# Patient Record
Sex: FEMALE | Race: WHITE | NOT HISPANIC OR LATINO | Employment: OTHER | URBAN - METROPOLITAN AREA
[De-identification: names, ages, dates, MRNs, and addresses within clinical notes are randomized per-mention and may not be internally consistent; named-entity substitution may affect disease eponyms.]

---

## 2019-12-16 ENCOUNTER — OFFICE VISIT (OUTPATIENT)
Dept: FAMILY MEDICINE | Facility: HOSPITAL | Age: 29
End: 2019-12-16
Attending: FAMILY MEDICINE
Payer: MEDICAID

## 2019-12-16 VITALS
WEIGHT: 115.31 LBS | HEART RATE: 105 BPM | SYSTOLIC BLOOD PRESSURE: 132 MMHG | HEIGHT: 66 IN | DIASTOLIC BLOOD PRESSURE: 85 MMHG | BODY MASS INDEX: 18.53 KG/M2

## 2019-12-16 DIAGNOSIS — Z01.419 WELL WOMAN EXAM WITH ROUTINE GYNECOLOGICAL EXAM: Primary | ICD-10-CM

## 2019-12-16 DIAGNOSIS — J45.20 MILD INTERMITTENT ASTHMA WITHOUT COMPLICATION: ICD-10-CM

## 2019-12-16 DIAGNOSIS — Z30.013 ENCOUNTER FOR INITIAL PRESCRIPTION OF INJECTABLE CONTRACEPTIVE: ICD-10-CM

## 2019-12-16 DIAGNOSIS — D22.5 NEVUS OF BUTTOCK: ICD-10-CM

## 2019-12-16 PROCEDURE — 99203 OFFICE O/P NEW LOW 30 MIN: CPT | Performed by: STUDENT IN AN ORGANIZED HEALTH CARE EDUCATION/TRAINING PROGRAM

## 2019-12-16 RX ORDER — ALBUTEROL SULFATE 90 UG/1
2 AEROSOL, METERED RESPIRATORY (INHALATION) EVERY 6 HOURS PRN
Qty: 18 G | Refills: 12 | Status: SHIPPED | OUTPATIENT
Start: 2019-12-16 | End: 2023-10-17

## 2019-12-16 RX ORDER — MEDROXYPROGESTERONE ACETATE 150 MG/ML
150 INJECTION, SUSPENSION INTRAMUSCULAR ONCE
Qty: 1 ML | Refills: 0 | Status: SHIPPED | OUTPATIENT
Start: 2019-12-16 | End: 2020-02-14

## 2019-12-16 NOTE — PROGRESS NOTES
Subjective:       Patient ID: Aury Dupree is a 29 y.o. female.    Chief Complaint: Well Woman exam     HPI   30 y/o female with a pmhx of Asthma, who presents to clinic today for well woman exam. Patient states that her periods have been heavy, however has had new onset menstrual cramping. Denies any pregnancy or previous pregnancies and  has a vasectomy. Patient uses 10-12 tampons a day and LMP 1 week ago and are regular. Patient does endorse post-coital bleeding. Has never had HPV vaccine. Patient states that her asthma is otherwise controlled and uses her inhaler intermittently. Patient interested in starting depo-provera. To help manager her heavy bleeding.   Pmhx: as per HPI      Allergies: Denies  Meds: Albuterol  Fhx: DM2 and HTN - mother/father  SHx: Denies any smoking, etoh or illicit drug use. Vaccinations up to date     Review of Systems   Constitutional: Negative for activity change and appetite change.   HENT: Negative for trouble swallowing.    Eyes: Negative for visual disturbance.   Respiratory: Negative for shortness of breath.    Cardiovascular: Negative for chest pain and palpitations.   Gastrointestinal: Negative for abdominal distention, diarrhea, nausea and vomiting.   Endocrine: Negative for cold intolerance and heat intolerance.   Genitourinary: Positive for menstrual problem. Negative for flank pain.   Musculoskeletal: Negative for arthralgias.   Skin: Negative for color change.   Allergic/Immunologic: Negative for immunocompromised state.   Neurological: Negative for headaches.   Hematological: Negative for adenopathy.   Psychiatric/Behavioral: Negative for agitation.       Objective:      Vitals:    12/16/19 1035   BP: 132/85   Pulse: 105     Physical Exam   Constitutional: She is oriented to person, place, and time. She appears well-developed and well-nourished.   HENT:   Head: Normocephalic and atraumatic.   Eyes: Pupils are equal, round, and reactive to light. EOM are normal.    Neck: Normal range of motion. Neck supple.   Cardiovascular: Normal rate, regular rhythm, normal heart sounds and intact distal pulses. Exam reveals no gallop and no friction rub.   No murmur heard.  Pulmonary/Chest: Effort normal and breath sounds normal. She has no wheezes. She has no rales.   Abdominal: Soft. Bowel sounds are normal. She exhibits no distension. There is no tenderness.   Genitourinary:   Genitourinary Comments: Pap smear and pelvic exam performed with Laurie Cunningham MA. Cervix appeared irregular and friable. No adnexal tenderness noted. No cervical motion tenderness.    Musculoskeletal: Normal range of motion.   Neurological: She is alert and oriented to person, place, and time.   Skin: Skin is warm and dry. Capillary refill takes less than 2 seconds.   Nevus with discoloration that is asymmetrical with irregular borders that is greater than 6mm on left buttox.    Psychiatric: She has a normal mood and affect.       Assessment:       1. Well woman exam with routine gynecological exam    2. Encounter for initial prescription of injectable contraceptive    3. Mild intermittent asthma without complication    4. Nevus of buttock        Plan:       Well woman exam with routine gynecological exam  -     TSH; Future; Expected date: 12/16/2019  -     CBC auto differential; Future; Expected date: 12/16/2019  -     Comprehensive metabolic panel; Future; Expected date: 12/16/2019  -     Liquid-Based Pap Smear, Screening with HPV testing   -     POCT urine pregnancy  -     Ambulatory referral to Gynecology - patient with irregular appearing cervix. May need colposcopy.     Encounter for initial prescription of injectable contraceptive  -     medroxyPROGESTERone (DEPO-PROVERA) 150 mg/mL Syrg; Inject 1 mL (150 mg total) into the muscle once. for 1 dose  Dispense: 1 mL; Refill: 0    Mild intermittent asthma without complication  -     albuterol (VENTOLIN HFA) 90 mcg/actuation inhaler; Inhale 2 puffs into the  lungs every 6 (six) hours as needed for Wheezing. Rescue  Dispense: 18 g; Refill: 12    Nevus of Buttox          -     Ambulatory referral to Dermatology for irregular appearing nevus on left buttox.     Follow up in about 1 month (around 1/16/2020).     Raghu Hunter MD   [employer] [facility], PGY-2

## 2019-12-16 NOTE — PROGRESS NOTES
I have reviewed the notes, assessments, and/or procedures performed, I concur with her/his documentation of Aury Dupree.

## 2020-01-01 NOTE — PROGRESS NOTES
Pap smear results from 12/16/19 performed at [facility].  Negative for intra-epithelial lesion or malignancy.   HPV mrNA E6/E7, Surepath - not detected

## 2020-01-13 ENCOUNTER — OFFICE VISIT (OUTPATIENT)
Dept: OBSTETRICS AND GYNECOLOGY | Facility: CLINIC | Age: 30
End: 2020-01-13
Payer: MEDICAID

## 2020-01-13 VITALS — BODY MASS INDEX: 18.56 KG/M2 | WEIGHT: 115 LBS | SYSTOLIC BLOOD PRESSURE: 116 MMHG | DIASTOLIC BLOOD PRESSURE: 68 MMHG

## 2020-01-13 DIAGNOSIS — Z30.09 GENERAL COUNSELING AND ADVICE ON FEMALE CONTRACEPTION: ICD-10-CM

## 2020-01-13 DIAGNOSIS — H02.109 ECTROPION, UNSPECIFIED ECTROPION TYPE, UNSPECIFIED LATERALITY: Primary | ICD-10-CM

## 2020-01-13 PROCEDURE — 99203 PR OFFICE/OUTPT VISIT, NEW, LEVL III, 30-44 MIN: ICD-10-PCS | Mod: S$PBB,,, | Performed by: OBSTETRICS & GYNECOLOGY

## 2020-01-13 PROCEDURE — 99212 OFFICE O/P EST SF 10 MIN: CPT | Mod: PBBFAC,PO | Performed by: OBSTETRICS & GYNECOLOGY

## 2020-01-13 PROCEDURE — 99999 PR PBB SHADOW E&M-EST. PATIENT-LVL II: ICD-10-PCS | Mod: PBBFAC,,, | Performed by: OBSTETRICS & GYNECOLOGY

## 2020-01-13 PROCEDURE — 99999 PR PBB SHADOW E&M-EST. PATIENT-LVL II: CPT | Mod: PBBFAC,,, | Performed by: OBSTETRICS & GYNECOLOGY

## 2020-01-13 PROCEDURE — 99203 OFFICE O/P NEW LOW 30 MIN: CPT | Mod: S$PBB,,, | Performed by: OBSTETRICS & GYNECOLOGY

## 2020-01-13 NOTE — LETTER
January 13, 2020      Raghu Hunter MD  [address]  [address]  [facility] [address]           [facility]  [address] [address]  Phone: [phone]          Patient: Aury Dupree   MR Number: [id number]   YOB: 1990   Date of Visit: 1/13/2020       Dear Dr. Raghu Hunter:    Thank you for referring Aury Dupree to me for evaluation. Attached you will find relevant portions of my assessment and plan of care.    If you have questions, please do not hesitate to call me. I look forward to following Aury Dupree along with you.    Sincerely,    Michael A. Wiedemann, MD    Enclosure  CC:  No Recipients    If you would like to receive this communication electronically, please contact [email or web address] or [phone] to request more information on [facility] access.    For providers and/or their staff who would like to refer a patient to [facility], please contact us through our one-stop-shop provider referral line, [facility] , at [phone].    If you feel you have received this communication in error or would no longer like to receive these types of communications, please e-mail [email or web address]

## 2020-01-13 NOTE — PROGRESS NOTES
29 y.o.   OB History    None       Comlaining of:  Pt had normal pap a month ago  Cycles reg but heavy 1st day or so  Wants birth control, cont type to avoid cycle  No irreg bleeding  Primary told her the cx looked bad      ROS:  GENERAL: No fever, chills, fatigability or weight loss.  SKIN: No rashes, itching or changes in color or texture of skin.  HEAD: No headaches or recent head trauma.  EYES: Visual acuity fine. No photophobia, ocular pain or diplopia.  EARS: Denies ear pain, discharge or vertigo.  NOSE: No loss of smell, no epistaxis or postnasal drip.  MOUTH & THROAT: No hoarseness or change in voice. No excessive gum bleeding.  NODES: Denies swollen glands.  CHEST: Denies IRAHETA, cyanosis, wheezing, cough and sputum production.  CARDIOVASCULAR: Denies chest pain, PND, orthopnea or reduced exercise tolerance.  ABDOMEN: Appetite fine. No weight loss. Denies diarrhea, abdominal pain, hematemesis or blood in stool.  URINARY: No flank pain, dysuria or hematuria.  PERIPHERAL VASCULAR: No claudication or cyanosis.  MUSCULOSKELETAL: No joint stiffness or swelling. Denies back pain.  NEUROLOGIC: No history of seizures, paralysis, alteration of gait or coordination      PE: /68   Wt 52.2 kg (115 lb)   LMP  (LMP Unknown)   BMI 18.56 kg/m²    abd soft  Vuvla, vag normal  cx appears normal, with ectropion  No lesions  Palpated, soft, smooth    A discussed ectropion being a normal variant, states md years ago told same  Discussed cont ocp, btb, etc    Plan, rx ocp  Fu prn

## 2020-01-14 RX ORDER — LEVONORGESTREL AND ETHINYL ESTRADIOL 0.15-0.03
1 KIT ORAL DAILY
Qty: 91 TABLET | Refills: 3 | Status: SHIPPED | OUTPATIENT
Start: 2020-01-14 | End: 2020-01-29

## 2020-01-22 ENCOUNTER — OFFICE VISIT (OUTPATIENT)
Dept: FAMILY MEDICINE | Facility: HOSPITAL | Age: 30
End: 2020-01-22
Attending: SPECIALIST
Payer: MEDICAID

## 2020-01-22 VITALS
WEIGHT: 115.5 LBS | SYSTOLIC BLOOD PRESSURE: 128 MMHG | HEIGHT: 66 IN | DIASTOLIC BLOOD PRESSURE: 83 MMHG | BODY MASS INDEX: 18.56 KG/M2 | TEMPERATURE: 97 F | HEART RATE: 101 BPM

## 2020-01-22 DIAGNOSIS — F41.1 GAD (GENERALIZED ANXIETY DISORDER): Primary | ICD-10-CM

## 2020-01-22 DIAGNOSIS — Z12.4 PAP SMEAR FOR CERVICAL CANCER SCREENING: ICD-10-CM

## 2020-01-22 PROCEDURE — 99213 OFFICE O/P EST LOW 20 MIN: CPT | Performed by: STUDENT IN AN ORGANIZED HEALTH CARE EDUCATION/TRAINING PROGRAM

## 2020-01-22 NOTE — PROGRESS NOTES
Subjective:       Patient ID: Aury Dupree is a 29 y.o. female.    Chief Complaint: TACOS Management     HPI     29  Female with a past medical history of asthma who presents to clinic here for follow-up.  Patient evaluated by gynecology prior to today's appointment 1 week prior and started patient on oral contraceptive.  Patient's recent Pap smear negative for any malignancy and HPV negative.  Ob Gyn/ evaluated cervix and determined it to be a normal variant on physical exam.  Patient has no fevers,no chills, no unintentional weight loss.  Patient doing well current birth control regimen.  Good urinary output.  Bowel movements daily.    Review of Systems   Constitutional: Negative for activity change and appetite change.   HENT: Negative for trouble swallowing.    Eyes: Negative for visual disturbance.   Respiratory: Negative for shortness of breath.    Cardiovascular: Negative for chest pain and palpitations.   Gastrointestinal: Negative for abdominal distention, diarrhea, nausea and vomiting.   Endocrine: Negative for cold intolerance and heat intolerance.   Genitourinary: Negative for flank pain.   Musculoskeletal: Negative for arthralgias.   Skin: Negative for color change.   Allergic/Immunologic: Negative for immunocompromised state.   Neurological: Negative for headaches.   Hematological: Negative for adenopathy.   Psychiatric/Behavioral: Negative for agitation.       Objective:      Vitals:    01/22/20 1108   BP: 128/83   Pulse: 101   Temp: 97.3 °F (36.3 °C)        Physical Exam   Constitutional: She is oriented to person, place, and time. She appears well-developed and well-nourished.   HENT:   Head: Normocephalic and atraumatic.   Eyes: Pupils are equal, round, and reactive to light. EOM are normal.   Neck: Normal range of motion. Neck supple.   Cardiovascular: Normal rate, regular rhythm, normal heart sounds and intact distal pulses. Exam reveals no gallop and no friction rub.   No murmur  heard.  Pulmonary/Chest: Effort normal and breath sounds normal. She has no wheezes. She has no rales.   Abdominal: Soft. Bowel sounds are normal. She exhibits no distension. There is no tenderness.   Musculoskeletal: Normal range of motion.   Neurological: She is alert and oriented to person, place, and time.   Skin: Skin is warm and dry. Capillary refill takes less than 2 seconds.   Psychiatric: She has a normal mood and affect.       Assessment:       1. TACOS (generalized anxiety disorder)    2. Pap smear for cervical cancer screening          Plan:       Generalized Anxiety Disorder   TACOS 7.   Mild and patient functional   Appointment to be scheduled with Dr. Núñez.     Pap smear for cervical cancer screening  Pap smear results from 12/16/19   Negative for intra-epithelial lesion or malignancy.   HPV mrNA E6/E7, Surepath - not detected   Results discussed with patient in detail and all questions answered.   GYN evaluated cervix and determined to be a normal variant.   Patient will need repeat pap smear with HPV screening in 5 years.     Follow up in about 1 year (around 1/22/2021) for Annual exam.        Raghu Hunter MD   [facility] [facility], PGY-2

## 2020-01-28 ENCOUNTER — TELEPHONE (OUTPATIENT)
Dept: FAMILY MEDICINE | Facility: HOSPITAL | Age: 30
End: 2020-01-28

## 2020-01-28 NOTE — TELEPHONE ENCOUNTER
----- Message from Sandi Quispe MA sent at 1/28/2020  4:28 PM CST -----  Contact:  Patient [phone]  Patient states the birth control she was put on is making her extremely nauseous.  Said she took it once and it made her throw up several times. Please call to discuss alternative.  Thanks.

## 2020-01-29 RX ORDER — NORETHINDRONE ACETATE AND ETHINYL ESTRADIOL 1; 5 MG/1; UG/1
1 TABLET ORAL DAILY
Qty: 30 TABLET | Refills: 11 | Status: SHIPPED | OUTPATIENT
Start: 2020-01-29 | End: 2020-12-15 | Stop reason: SDUPTHER

## 2020-02-14 ENCOUNTER — OFFICE VISIT (OUTPATIENT)
Dept: FAMILY MEDICINE | Facility: HOSPITAL | Age: 30
End: 2020-02-14
Attending: FAMILY MEDICINE
Payer: MEDICAID

## 2020-02-14 VITALS
BODY MASS INDEX: 18.49 KG/M2 | SYSTOLIC BLOOD PRESSURE: 137 MMHG | HEART RATE: 123 BPM | DIASTOLIC BLOOD PRESSURE: 85 MMHG | WEIGHT: 115.06 LBS | HEIGHT: 66 IN

## 2020-02-14 DIAGNOSIS — B86 SCABIES: Primary | ICD-10-CM

## 2020-02-14 PROCEDURE — 99213 OFFICE O/P EST LOW 20 MIN: CPT | Performed by: STUDENT IN AN ORGANIZED HEALTH CARE EDUCATION/TRAINING PROGRAM

## 2020-02-14 RX ORDER — IVERMECTIN 3 MG/1
10 TABLET ORAL ONCE
Qty: 4 TABLET | Refills: 0 | Status: SHIPPED | OUTPATIENT
Start: 2020-02-14 | End: 2020-02-14

## 2020-02-14 RX ORDER — IVERMECTIN 3 MG/1
12 TABLET ORAL ONCE
Qty: 4 TABLET | Refills: 0 | Status: SHIPPED | OUTPATIENT
Start: 2020-02-14 | End: 2020-02-14

## 2020-02-14 NOTE — PROGRESS NOTES
Subjective:       Patient ID: Aury Dupree is a 29 y.o. female.    Chief Complaint: Urticaria    28 y/o female presenting today complaining of diffuse rash onset gradually about 4 weeks ago. She reports that she first noticed small red lesions on her bilateral legs, but the rash has now spread to involve her abdomen, back, arms, legs, hands, and feet. The rash spares her neck and face. It is associated with pruritis but is not painful. She has attempted to treat her symptoms at home with an over the counter antihistamine and goldbond cream with minimal relief. She denies fever, chills, N/V, diarrhea or any other symptoms at this time. Denies any recent changes in medications, food, soaps, or laundry detergents. Denies any recent travel. She reports that her  developed similar symptoms about 3 days ago as well.       Review of Systems   Constitutional: Negative for activity change, appetite change and fever.   HENT: Negative for trouble swallowing.    Eyes: Negative for visual disturbance.   Respiratory: Negative for shortness of breath.    Cardiovascular: Negative for chest pain and palpitations.   Gastrointestinal: Negative for abdominal distention, diarrhea, nausea and vomiting.   Endocrine: Negative for cold intolerance and heat intolerance.   Genitourinary: Negative for flank pain.   Musculoskeletal: Negative for arthralgias.   Skin: Positive for rash (to trunk, upper and lower extremities, hands, and feet). Negative for color change.        (+) pruritis   Allergic/Immunologic: Negative for immunocompromised state.   Neurological: Negative for headaches.   Hematological: Negative for adenopathy.   Psychiatric/Behavioral: Negative for agitation.       Objective:      Vitals:    02/14/20 0945   BP: 137/85   Pulse: (!) 123     Physical Exam   Constitutional: She is oriented to person, place, and time. She appears well-developed and well-nourished.   HENT:   Head: Normocephalic and atraumatic.   Eyes: Pupils  are equal, round, and reactive to light. EOM are normal.   Neck: Normal range of motion. Neck supple.   Cardiovascular: Normal rate, regular rhythm, normal heart sounds and intact distal pulses. Exam reveals no gallop and no friction rub.   No murmur heard.  Pulmonary/Chest: Effort normal and breath sounds normal. She has no wheezes. She has no rales.   Abdominal: Soft. Bowel sounds are normal. She exhibits no distension. There is no tenderness.   Musculoskeletal: Normal range of motion.   Neurological: She is alert and oriented to person, place, and time.   Skin: Skin is warm and dry. Capillary refill takes less than 2 seconds. Rash (Diffuse nonblanching rash to lower back, abdomen, bilateral UE and LE with small raised lesions noted to bilateral hands and finger webs) noted.   Psychiatric: She has a normal mood and affect.       Assessment:       1. Scabies        Plan:       Scabies  -     ivermectin (STROMECTOL) 3 mg Tab; Take 3.5 tablets (10.5 mg total) by mouth once. for 1 dose  Dispense: 4 tablet; Refill: 0         -    Patient to be treated for scabies today as well as partner. Patient given education at bedside regarding infection and ways to minimize re-infection after treatment. Patient and partner instructed to follow up with this clinic if any problems arise. All questions answers. Return precautions discussed. Patient will likely need repeat dosing in 2 weeks to ensure resolution of infection.     Follow up in about 2 weeks (around 2/28/2020).     Raghu Hunter MD   [facility], PGY-2

## 2020-03-03 ENCOUNTER — OFFICE VISIT (OUTPATIENT)
Dept: FAMILY MEDICINE | Facility: HOSPITAL | Age: 30
End: 2020-03-03
Attending: FAMILY MEDICINE
Payer: MEDICAID

## 2020-03-03 VITALS
HEIGHT: 66 IN | SYSTOLIC BLOOD PRESSURE: 135 MMHG | WEIGHT: 115.5 LBS | DIASTOLIC BLOOD PRESSURE: 89 MMHG | BODY MASS INDEX: 18.56 KG/M2 | HEART RATE: 114 BPM

## 2020-03-03 DIAGNOSIS — B86 SCABIES: Primary | ICD-10-CM

## 2020-03-03 DIAGNOSIS — Z83.1: ICD-10-CM

## 2020-03-03 PROCEDURE — 99213 OFFICE O/P EST LOW 20 MIN: CPT | Performed by: STUDENT IN AN ORGANIZED HEALTH CARE EDUCATION/TRAINING PROGRAM

## 2020-03-03 RX ORDER — IVERMECTIN 3 MG/1
TABLET ORAL
Qty: 4 TABLET | Refills: 1 | Status: SHIPPED | OUTPATIENT
Start: 2020-03-03 | End: 2021-09-14

## 2020-03-03 RX ORDER — IVERMECTIN 3 MG/1
TABLET ORAL
Qty: 4 TABLET | Refills: 2 | Status: SHIPPED | OUTPATIENT
Start: 2020-03-03 | End: 2020-03-03

## 2020-03-03 RX ORDER — IVERMECTIN 3 MG/1
TABLET ORAL
COMMUNITY
Start: 2020-02-15 | End: 2020-03-03 | Stop reason: SDUPTHER

## 2020-03-03 NOTE — PROGRESS NOTES
Subjective:       Patient ID: Aury Dupree is a 29 y.o. female.    Chief Complaint: scabies  HPI     She was taking ivermecttin 2 weeks ago, she and her . They feel better initially and now feel like they're coming back. Itching worst at night. She cleaned up whole apartment. They have been cleaned and washing bedsheet in warm/hot water. She said her  was given a script last time as well.        Review of Systems   Constitutional: Negative for activity change, appetite change and fever.   HENT: Negative for trouble swallowing.    Eyes: Negative for visual disturbance.   Respiratory: Negative for shortness of breath.    Cardiovascular: Negative for chest pain and palpitations.   Gastrointestinal: Negative for abdominal distention, diarrhea, nausea and vomiting.   Endocrine: Negative for cold intolerance and heat intolerance.   Genitourinary: Negative for flank pain.   Musculoskeletal: Negative for arthralgias.   Skin: Positive for rash (rash localized to both palms and forearms). Negative for color change.        (+) pruritis   Allergic/Immunologic: Negative for immunocompromised state.   Neurological: Negative for headaches.   Hematological: Negative for adenopathy.   Psychiatric/Behavioral: Negative for agitation.         Objective:      Vitals:    03/03/20 1016   BP: 135/89   Pulse: (!) 114     Physical Exam   Constitutional: She is oriented to person, place, and time. She appears well-developed and well-nourished.   HENT:   Head: Normocephalic and atraumatic.   Eyes: Pupils are equal, round, and reactive to light. EOM are normal.   Neck: Normal range of motion. Neck supple.   Cardiovascular: Normal rate, regular rhythm, normal heart sounds and intact distal pulses. Exam reveals no gallop and no friction rub.   No murmur heard.  Pulmonary/Chest: Effort normal and breath sounds normal. She has no wheezes. She has no rales.   Abdominal: Soft. Bowel sounds are normal. She exhibits no distension. There  is no tenderness.   Musculoskeletal: Normal range of motion.   Neurological: She is alert and oriented to person, place, and time.   Skin: Skin is warm and dry. Capillary refill takes less than 2 seconds. Rash (Diffuse nonblanching rash to lower back, abdomen, bilateral UE and LE with small raised lesions noted to bilateral hands and finger webs) noted.   Psychiatric: She has a normal mood and affect.                 Assessment:       1. Scabies    2. Family history of scabies        Plan:       Scabies  -     ivermectin (STROMECTOL) 3 mg Tab; TK 4 TS PO ONCE FOR 1 DOSE on Week 1 and then week 2 take 2nd dose.  Dispense: 4 tablet; Refill: 1    Family history of scabies  -     ivermectin (STROMECTOL) 3 mg Tab; TK 4 TS PO ONCE FOR 1 DOSE on Week 1 and then week 2 take 2nd dose.  Dispense: 4 tablet; Refill: 1    Given  a script of the same ivermectin as pt requested.      Follow up in about 4 weeks (around 3/31/2020).

## 2020-03-03 NOTE — PROGRESS NOTES
I assume primary medical responsibility for this patient. I have reviewed the history, physical, and assessement & treatment plan with the resident and agree that the care is reasonable and necessary. This service has been performed by a resident without the presence of a teaching physician under the primary care exception. If necessary, an addendum of additional findings or evaluation beyond the resident documentation will be noted below.    Restart tx for scabies as only completed half of first course    Jessica Freeman MD

## 2020-12-17 RX ORDER — NORETHINDRONE ACETATE AND ETHINYL ESTRADIOL 1; 5 MG/1; UG/1
1 TABLET ORAL DAILY
Qty: 30 TABLET | Refills: 11 | Status: SHIPPED | OUTPATIENT
Start: 2020-12-17 | End: 2021-05-12

## 2021-01-11 ENCOUNTER — OFFICE VISIT (OUTPATIENT)
Dept: FAMILY MEDICINE | Facility: HOSPITAL | Age: 31
End: 2021-01-11
Attending: FAMILY MEDICINE
Payer: MEDICAID

## 2021-01-11 VITALS
HEART RATE: 117 BPM | SYSTOLIC BLOOD PRESSURE: 150 MMHG | WEIGHT: 115.5 LBS | DIASTOLIC BLOOD PRESSURE: 102 MMHG | BODY MASS INDEX: 18.56 KG/M2 | HEIGHT: 66 IN

## 2021-01-11 DIAGNOSIS — L50.8 CHRONIC URTICARIA: ICD-10-CM

## 2021-01-11 DIAGNOSIS — F41.1 GAD (GENERALIZED ANXIETY DISORDER): Primary | ICD-10-CM

## 2021-01-11 PROCEDURE — 99213 OFFICE O/P EST LOW 20 MIN: CPT | Performed by: STUDENT IN AN ORGANIZED HEALTH CARE EDUCATION/TRAINING PROGRAM

## 2021-01-11 RX ORDER — HYDROXYZINE HYDROCHLORIDE 10 MG/1
10 TABLET, FILM COATED ORAL 3 TIMES DAILY PRN
Qty: 90 TABLET | Refills: 0 | Status: SHIPPED | OUTPATIENT
Start: 2021-01-11 | End: 2021-01-17

## 2021-01-13 ENCOUNTER — TELEPHONE (OUTPATIENT)
Dept: FAMILY MEDICINE | Facility: HOSPITAL | Age: 31
End: 2021-01-13

## 2021-01-17 RX ORDER — DIPHENHYDRAMINE HCL 50 MG
50 CAPSULE ORAL NIGHTLY PRN
Qty: 30 CAPSULE | Refills: 0 | Status: SHIPPED | OUTPATIENT
Start: 2021-01-17 | End: 2023-08-24

## 2021-03-03 ENCOUNTER — OFFICE VISIT (OUTPATIENT)
Dept: FAMILY MEDICINE | Facility: HOSPITAL | Age: 31
End: 2021-03-03
Payer: MEDICAID

## 2021-03-03 ENCOUNTER — LAB VISIT (OUTPATIENT)
Dept: LAB | Facility: HOSPITAL | Age: 31
End: 2021-03-03
Attending: STUDENT IN AN ORGANIZED HEALTH CARE EDUCATION/TRAINING PROGRAM
Payer: MEDICAID

## 2021-03-03 VITALS
SYSTOLIC BLOOD PRESSURE: 146 MMHG | HEART RATE: 146 BPM | BODY MASS INDEX: 18.56 KG/M2 | HEIGHT: 66 IN | DIASTOLIC BLOOD PRESSURE: 101 MMHG | WEIGHT: 115.5 LBS

## 2021-03-03 DIAGNOSIS — L50.9 HIVES: ICD-10-CM

## 2021-03-03 DIAGNOSIS — L50.9 HIVES: Primary | ICD-10-CM

## 2021-03-03 LAB
ALBUMIN SERPL BCP-MCNC: 4.5 G/DL (ref 3.5–5.2)
ALP SERPL-CCNC: 48 U/L (ref 55–135)
ALT SERPL W/O P-5'-P-CCNC: 16 U/L (ref 10–44)
ANION GAP SERPL CALC-SCNC: 13 MMOL/L (ref 8–16)
AST SERPL-CCNC: 17 U/L (ref 10–40)
BASOPHILS # BLD AUTO: 0.05 K/UL (ref 0–0.2)
BASOPHILS NFR BLD: 0.5 % (ref 0–1.9)
BILIRUB SERPL-MCNC: 0.3 MG/DL (ref 0.1–1)
BUN SERPL-MCNC: 10 MG/DL (ref 6–20)
CALCIUM SERPL-MCNC: 9.2 MG/DL (ref 8.7–10.5)
CHLORIDE SERPL-SCNC: 108 MMOL/L (ref 95–110)
CO2 SERPL-SCNC: 19 MMOL/L (ref 23–29)
CREAT SERPL-MCNC: 0.8 MG/DL (ref 0.5–1.4)
DIFFERENTIAL METHOD: ABNORMAL
EOSINOPHIL # BLD AUTO: 0.3 K/UL (ref 0–0.5)
EOSINOPHIL NFR BLD: 2.8 % (ref 0–8)
ERYTHROCYTE [DISTWIDTH] IN BLOOD BY AUTOMATED COUNT: 11.5 % (ref 11.5–14.5)
EST. GFR  (AFRICAN AMERICAN): >60 ML/MIN/1.73 M^2
EST. GFR  (NON AFRICAN AMERICAN): >60 ML/MIN/1.73 M^2
GLUCOSE SERPL-MCNC: 95 MG/DL (ref 70–110)
HCT VFR BLD AUTO: 42.5 % (ref 37–48.5)
HGB BLD-MCNC: 14.4 G/DL (ref 12–16)
IMM GRANULOCYTES # BLD AUTO: 0.04 K/UL (ref 0–0.04)
IMM GRANULOCYTES NFR BLD AUTO: 0.4 % (ref 0–0.5)
LYMPHOCYTES # BLD AUTO: 1.3 K/UL (ref 1–4.8)
LYMPHOCYTES NFR BLD: 12.7 % (ref 18–48)
MCH RBC QN AUTO: 31.4 PG (ref 27–31)
MCHC RBC AUTO-ENTMCNC: 33.9 G/DL (ref 32–36)
MCV RBC AUTO: 93 FL (ref 82–98)
MONOCYTES # BLD AUTO: 0.5 K/UL (ref 0.3–1)
MONOCYTES NFR BLD: 5.3 % (ref 4–15)
NEUTROPHILS # BLD AUTO: 7.9 K/UL (ref 1.8–7.7)
NEUTROPHILS NFR BLD: 78.3 % (ref 38–73)
NRBC BLD-RTO: 0 /100 WBC
PLATELET # BLD AUTO: 284 K/UL (ref 150–350)
PMV BLD AUTO: 9.6 FL (ref 9.2–12.9)
POTASSIUM SERPL-SCNC: 3.6 MMOL/L (ref 3.5–5.1)
PROT SERPL-MCNC: 8.1 G/DL (ref 6–8.4)
RBC # BLD AUTO: 4.59 M/UL (ref 4–5.4)
SODIUM SERPL-SCNC: 140 MMOL/L (ref 136–145)
T4 FREE SERPL-MCNC: 1.16 NG/DL (ref 0.71–1.51)
TSH SERPL DL<=0.005 MIU/L-ACNC: 2.1 UIU/ML (ref 0.4–4)
WBC # BLD AUTO: 10.04 K/UL (ref 3.9–12.7)

## 2021-03-03 PROCEDURE — 85025 COMPLETE CBC W/AUTO DIFF WBC: CPT | Performed by: STUDENT IN AN ORGANIZED HEALTH CARE EDUCATION/TRAINING PROGRAM

## 2021-03-03 PROCEDURE — 86800 THYROGLOBULIN ANTIBODY: CPT | Performed by: STUDENT IN AN ORGANIZED HEALTH CARE EDUCATION/TRAINING PROGRAM

## 2021-03-03 PROCEDURE — 80053 COMPREHEN METABOLIC PANEL: CPT | Performed by: STUDENT IN AN ORGANIZED HEALTH CARE EDUCATION/TRAINING PROGRAM

## 2021-03-03 PROCEDURE — 84439 ASSAY OF FREE THYROXINE: CPT | Performed by: STUDENT IN AN ORGANIZED HEALTH CARE EDUCATION/TRAINING PROGRAM

## 2021-03-03 PROCEDURE — 84445 ASSAY OF TSI GLOBULIN: CPT | Performed by: STUDENT IN AN ORGANIZED HEALTH CARE EDUCATION/TRAINING PROGRAM

## 2021-03-03 PROCEDURE — 99214 OFFICE O/P EST MOD 30 MIN: CPT | Performed by: STUDENT IN AN ORGANIZED HEALTH CARE EDUCATION/TRAINING PROGRAM

## 2021-03-03 PROCEDURE — 84443 ASSAY THYROID STIM HORMONE: CPT | Performed by: STUDENT IN AN ORGANIZED HEALTH CARE EDUCATION/TRAINING PROGRAM

## 2021-03-03 PROCEDURE — 86376 MICROSOMAL ANTIBODY EACH: CPT | Performed by: STUDENT IN AN ORGANIZED HEALTH CARE EDUCATION/TRAINING PROGRAM

## 2021-03-03 PROCEDURE — 36415 COLL VENOUS BLD VENIPUNCTURE: CPT | Performed by: STUDENT IN AN ORGANIZED HEALTH CARE EDUCATION/TRAINING PROGRAM

## 2021-03-03 PROCEDURE — 82785 ASSAY OF IGE: CPT | Performed by: STUDENT IN AN ORGANIZED HEALTH CARE EDUCATION/TRAINING PROGRAM

## 2021-03-04 DIAGNOSIS — E06.3 HASHIMOTO'S DISEASE: Primary | ICD-10-CM

## 2021-03-04 LAB
IGE SERPL-ACNC: 392 IU/ML (ref 0–100)
THYROGLOB AB SERPL IA-ACNC: 432.2 IU/ML (ref 0–3.9)
THYROPEROXIDASE IGG SERPL-ACNC: <6 IU/ML

## 2021-03-08 LAB — TSI SER-ACNC: <0.1 IU/L

## 2021-04-08 ENCOUNTER — OFFICE VISIT (OUTPATIENT)
Dept: FAMILY MEDICINE | Facility: HOSPITAL | Age: 31
End: 2021-04-08
Attending: FAMILY MEDICINE
Payer: MEDICAID

## 2021-04-08 VITALS
SYSTOLIC BLOOD PRESSURE: 158 MMHG | WEIGHT: 116.88 LBS | HEIGHT: 66 IN | HEART RATE: 132 BPM | BODY MASS INDEX: 18.79 KG/M2 | DIASTOLIC BLOOD PRESSURE: 106 MMHG

## 2021-04-08 DIAGNOSIS — F40.10 SOCIAL ANXIETY DISORDER: ICD-10-CM

## 2021-04-08 DIAGNOSIS — J32.4 CHRONIC PANSINUSITIS: Primary | ICD-10-CM

## 2021-04-08 PROCEDURE — 99213 OFFICE O/P EST LOW 20 MIN: CPT | Performed by: STUDENT IN AN ORGANIZED HEALTH CARE EDUCATION/TRAINING PROGRAM

## 2021-04-08 RX ORDER — MONTELUKAST SODIUM 10 MG/1
10 TABLET ORAL NIGHTLY
Qty: 30 TABLET | Refills: 12 | Status: SHIPPED | OUTPATIENT
Start: 2021-04-08 | End: 2021-05-08

## 2021-04-08 RX ORDER — FLUOXETINE 10 MG/1
10 CAPSULE ORAL DAILY
Qty: 30 CAPSULE | Refills: 1 | Status: SHIPPED | OUTPATIENT
Start: 2021-04-08 | End: 2021-05-12

## 2021-04-11 ENCOUNTER — IMMUNIZATION (OUTPATIENT)
Dept: PRIMARY CARE CLINIC | Facility: CLINIC | Age: 31
End: 2021-04-11
Payer: MEDICAID

## 2021-04-11 DIAGNOSIS — Z23 NEED FOR VACCINATION: Primary | ICD-10-CM

## 2021-04-11 PROCEDURE — 0001A PR IMMUNIZ ADMIN, SARS-COV-2 COVID-19 VACC, 30MCG/0.3ML, 1ST DOSE: CPT | Mod: CV19,S$GLB,, | Performed by: INTERNAL MEDICINE

## 2021-04-11 PROCEDURE — 91300 PR SARS-COV- 2 COVID-19 VACCINE, NO PRSV, 30MCG/0.3ML, IM: CPT | Mod: S$GLB,,, | Performed by: INTERNAL MEDICINE

## 2021-04-11 PROCEDURE — 0001A PR IMMUNIZ ADMIN, SARS-COV-2 COVID-19 VACC, 30MCG/0.3ML, 1ST DOSE: ICD-10-PCS | Mod: CV19,S$GLB,, | Performed by: INTERNAL MEDICINE

## 2021-04-11 PROCEDURE — 91300 PR SARS-COV- 2 COVID-19 VACCINE, NO PRSV, 30MCG/0.3ML, IM: ICD-10-PCS | Mod: S$GLB,,, | Performed by: INTERNAL MEDICINE

## 2021-04-11 RX ADMIN — Medication 0.3 ML: at 10:04

## 2021-05-01 ENCOUNTER — IMMUNIZATION (OUTPATIENT)
Dept: PRIMARY CARE CLINIC | Facility: CLINIC | Age: 31
End: 2021-05-01
Payer: MEDICAID

## 2021-05-01 DIAGNOSIS — Z23 NEED FOR VACCINATION: Primary | ICD-10-CM

## 2021-05-01 PROCEDURE — 0002A PR IMMUNIZ ADMIN, SARS-COV-2 COVID-19 VACC, 30MCG/0.3ML, 2ND DOSE: CPT | Mod: PBBFAC | Performed by: INTERNAL MEDICINE

## 2021-05-01 PROCEDURE — 91300 PR SARS-COV- 2 COVID-19 VACCINE, NO PRSV, 30MCG/0.3ML, IM: CPT | Mod: PBBFAC | Performed by: INTERNAL MEDICINE

## 2021-05-01 RX ADMIN — RNA INGREDIENT BNT-162B2 0.3 ML: 0.23 INJECTION, SUSPENSION INTRAMUSCULAR at 09:05

## 2021-05-12 ENCOUNTER — OFFICE VISIT (OUTPATIENT)
Dept: FAMILY MEDICINE | Facility: HOSPITAL | Age: 31
End: 2021-05-12
Payer: MEDICAID

## 2021-05-12 VITALS
WEIGHT: 113.31 LBS | HEIGHT: 66 IN | BODY MASS INDEX: 18.21 KG/M2 | HEART RATE: 120 BPM | DIASTOLIC BLOOD PRESSURE: 91 MMHG | SYSTOLIC BLOOD PRESSURE: 154 MMHG

## 2021-05-12 DIAGNOSIS — F41.9 ANXIETY: ICD-10-CM

## 2021-05-12 DIAGNOSIS — I10 ESSENTIAL HYPERTENSION: ICD-10-CM

## 2021-05-12 DIAGNOSIS — L50.8 CHRONIC URTICARIA: Primary | ICD-10-CM

## 2021-05-12 PROCEDURE — 99213 OFFICE O/P EST LOW 20 MIN: CPT | Performed by: STUDENT IN AN ORGANIZED HEALTH CARE EDUCATION/TRAINING PROGRAM

## 2021-05-12 RX ORDER — PROPRANOLOL HYDROCHLORIDE 60 MG/1
60 CAPSULE, EXTENDED RELEASE ORAL NIGHTLY
Qty: 30 CAPSULE | Refills: 1 | Status: SHIPPED | OUTPATIENT
Start: 2021-05-12 | End: 2021-06-15

## 2021-05-12 RX ORDER — FLUOXETINE HYDROCHLORIDE 20 MG/1
20 CAPSULE ORAL DAILY
Qty: 30 CAPSULE | Refills: 1 | Status: SHIPPED | OUTPATIENT
Start: 2021-05-12 | End: 2021-05-26

## 2021-05-13 ENCOUNTER — PATIENT MESSAGE (OUTPATIENT)
Dept: FAMILY MEDICINE | Facility: HOSPITAL | Age: 31
End: 2021-05-13

## 2021-05-26 ENCOUNTER — OFFICE VISIT (OUTPATIENT)
Dept: FAMILY MEDICINE | Facility: HOSPITAL | Age: 31
End: 2021-05-26
Attending: FAMILY MEDICINE
Payer: MEDICAID

## 2021-05-26 VITALS
HEIGHT: 66 IN | BODY MASS INDEX: 18.03 KG/M2 | DIASTOLIC BLOOD PRESSURE: 91 MMHG | HEART RATE: 84 BPM | SYSTOLIC BLOOD PRESSURE: 134 MMHG | WEIGHT: 112.19 LBS

## 2021-05-26 DIAGNOSIS — F41.1 GAD (GENERALIZED ANXIETY DISORDER): ICD-10-CM

## 2021-05-26 DIAGNOSIS — Z30.09 GENERAL COUNSELING AND ADVICE ON FEMALE CONTRACEPTION: ICD-10-CM

## 2021-05-26 DIAGNOSIS — L50.8 CHRONIC URTICARIA: Primary | ICD-10-CM

## 2021-05-26 PROCEDURE — 99213 OFFICE O/P EST LOW 20 MIN: CPT | Performed by: STUDENT IN AN ORGANIZED HEALTH CARE EDUCATION/TRAINING PROGRAM

## 2021-05-26 RX ORDER — NORETHINDRONE ACETATE AND ETHINYL ESTRADIOL 1; 5 MG/1; UG/1
1 TABLET ORAL DAILY
Qty: 30 TABLET | Refills: 11 | Status: SHIPPED | OUTPATIENT
Start: 2021-05-26 | End: 2021-10-15 | Stop reason: SDUPTHER

## 2021-05-26 RX ORDER — FLUOXETINE HYDROCHLORIDE 40 MG/1
40 CAPSULE ORAL DAILY
Qty: 30 CAPSULE | Refills: 1 | Status: SHIPPED | OUTPATIENT
Start: 2021-05-26 | End: 2021-07-23 | Stop reason: SDUPTHER

## 2021-06-15 ENCOUNTER — HOSPITAL ENCOUNTER (OUTPATIENT)
Dept: RADIOLOGY | Facility: HOSPITAL | Age: 31
Discharge: HOME | End: 2021-06-15
Attending: STUDENT IN AN ORGANIZED HEALTH CARE EDUCATION/TRAINING PROGRAM
Payer: MEDICAID

## 2021-06-15 ENCOUNTER — CLINICAL SUPPORT (OUTPATIENT)
Dept: LAB | Facility: HOSPITAL | Age: 31
End: 2021-06-15
Attending: STUDENT IN AN ORGANIZED HEALTH CARE EDUCATION/TRAINING PROGRAM
Payer: MEDICAID

## 2021-06-15 ENCOUNTER — OFFICE VISIT (OUTPATIENT)
Dept: FAMILY MEDICINE | Facility: HOSPITAL | Age: 31
End: 2021-06-15
Payer: MEDICAID

## 2021-06-15 VITALS
WEIGHT: 110.69 LBS | BODY MASS INDEX: 17.37 KG/M2 | HEART RATE: 127 BPM | HEIGHT: 67 IN | DIASTOLIC BLOOD PRESSURE: 93 MMHG | SYSTOLIC BLOOD PRESSURE: 142 MMHG

## 2021-06-15 DIAGNOSIS — R63.6 MILDLY UNDERWEIGHT ADULT: ICD-10-CM

## 2021-06-15 DIAGNOSIS — R00.0 TACHYCARDIA: ICD-10-CM

## 2021-06-15 DIAGNOSIS — R73.01 IFG (IMPAIRED FASTING GLUCOSE): ICD-10-CM

## 2021-06-15 DIAGNOSIS — R01.1 MURMUR, CARDIAC: ICD-10-CM

## 2021-06-15 DIAGNOSIS — E05.90 THYROTOXICOSIS WITHOUT THYROID STORM, UNSPECIFIED THYROTOXICOSIS TYPE: ICD-10-CM

## 2021-06-15 DIAGNOSIS — R76.8 THYROGLOBULIN ANTIBODY POSITIVE: ICD-10-CM

## 2021-06-15 DIAGNOSIS — R00.0 TACHYCARDIA: Primary | ICD-10-CM

## 2021-06-15 PROCEDURE — 99214 OFFICE O/P EST MOD 30 MIN: CPT | Mod: 25 | Performed by: STUDENT IN AN ORGANIZED HEALTH CARE EDUCATION/TRAINING PROGRAM

## 2021-06-15 PROCEDURE — 76536 US SOFT TISSUE HEAD NECK THYROID: ICD-10-PCS | Mod: 26,,, | Performed by: RADIOLOGY

## 2021-06-15 PROCEDURE — 76536 US EXAM OF HEAD AND NECK: CPT | Mod: TC

## 2021-06-15 PROCEDURE — 76536 US EXAM OF HEAD AND NECK: CPT | Mod: 26,,, | Performed by: RADIOLOGY

## 2021-06-15 PROCEDURE — 93005 ELECTROCARDIOGRAM TRACING: CPT

## 2021-06-15 PROCEDURE — 93010 ELECTROCARDIOGRAM REPORT: CPT | Mod: ,,, | Performed by: INTERNAL MEDICINE

## 2021-06-15 PROCEDURE — 93010 EKG 12-LEAD: ICD-10-PCS | Mod: ,,, | Performed by: INTERNAL MEDICINE

## 2021-06-15 RX ORDER — LEVOCETIRIZINE DIHYDROCHLORIDE 5 MG/1
5 TABLET, FILM COATED ORAL NIGHTLY
COMMUNITY
Start: 2021-05-17 | End: 2021-06-15

## 2021-06-15 RX ORDER — FAMOTIDINE 20 MG/1
20 TABLET, FILM COATED ORAL 2 TIMES DAILY
COMMUNITY
Start: 2021-05-17 | End: 2023-08-24

## 2021-06-15 RX ORDER — FLUTICASONE PROPIONATE 50 MCG
SPRAY, SUSPENSION (ML) NASAL
COMMUNITY
Start: 2021-05-17 | End: 2023-08-24

## 2021-06-17 PROBLEM — E04.1 RIGHT THYROID NODULE: Status: ACTIVE | Noted: 2021-06-17

## 2021-06-30 ENCOUNTER — PATIENT MESSAGE (OUTPATIENT)
Dept: FAMILY MEDICINE | Facility: HOSPITAL | Age: 31
End: 2021-06-30

## 2021-07-08 ENCOUNTER — HOSPITAL ENCOUNTER (OUTPATIENT)
Dept: RADIOLOGY | Facility: HOSPITAL | Age: 31
Discharge: HOME | End: 2021-07-08
Attending: STUDENT IN AN ORGANIZED HEALTH CARE EDUCATION/TRAINING PROGRAM
Payer: MEDICAID

## 2021-07-08 DIAGNOSIS — E05.90 THYROTOXICOSIS WITHOUT THYROID STORM, UNSPECIFIED THYROTOXICOSIS TYPE: ICD-10-CM

## 2021-07-08 DIAGNOSIS — R63.6 MILDLY UNDERWEIGHT ADULT: ICD-10-CM

## 2021-07-08 DIAGNOSIS — R01.1 MURMUR, CARDIAC: ICD-10-CM

## 2021-07-08 DIAGNOSIS — R76.8 THYROGLOBULIN ANTIBODY POSITIVE: ICD-10-CM

## 2021-07-08 DIAGNOSIS — R00.0 TACHYCARDIA: ICD-10-CM

## 2021-07-08 PROCEDURE — 78014 THYROID IMAGING W/BLOOD FLOW: CPT | Mod: 26,,, | Performed by: RADIOLOGY

## 2021-07-08 PROCEDURE — 78014 NM THYROID UPTAKE AND SCAN: ICD-10-PCS | Mod: 26,,, | Performed by: RADIOLOGY

## 2021-07-08 PROCEDURE — 78014 THYROID IMAGING W/BLOOD FLOW: CPT | Mod: TC

## 2021-07-09 ENCOUNTER — HOSPITAL ENCOUNTER (OUTPATIENT)
Dept: RADIOLOGY | Facility: HOSPITAL | Age: 31
Discharge: HOME | End: 2021-07-09
Attending: STUDENT IN AN ORGANIZED HEALTH CARE EDUCATION/TRAINING PROGRAM
Payer: MEDICAID

## 2021-08-13 ENCOUNTER — OFFICE VISIT (OUTPATIENT)
Dept: FAMILY MEDICINE | Facility: HOSPITAL | Age: 31
End: 2021-08-13
Payer: MEDICAID

## 2021-08-13 ENCOUNTER — LAB VISIT (OUTPATIENT)
Dept: LAB | Facility: HOSPITAL | Age: 31
End: 2021-08-13
Attending: STUDENT IN AN ORGANIZED HEALTH CARE EDUCATION/TRAINING PROGRAM
Payer: MEDICAID

## 2021-08-13 VITALS
HEIGHT: 67 IN | HEART RATE: 112 BPM | DIASTOLIC BLOOD PRESSURE: 86 MMHG | SYSTOLIC BLOOD PRESSURE: 135 MMHG | BODY MASS INDEX: 17.65 KG/M2 | WEIGHT: 112.44 LBS

## 2021-08-13 DIAGNOSIS — Z11.4 SCREENING FOR HIV (HUMAN IMMUNODEFICIENCY VIRUS): ICD-10-CM

## 2021-08-13 DIAGNOSIS — E05.00 THYROTOXICOSIS WITH DIFFUSE GOITER AND WITHOUT THYROID STORM: ICD-10-CM

## 2021-08-13 DIAGNOSIS — G89.29 CHRONIC BILATERAL BACK PAIN, UNSPECIFIED BACK LOCATION: ICD-10-CM

## 2021-08-13 DIAGNOSIS — M54.9 CHRONIC BILATERAL BACK PAIN, UNSPECIFIED BACK LOCATION: ICD-10-CM

## 2021-08-13 DIAGNOSIS — R01.1 MURMUR, CARDIAC: Primary | ICD-10-CM

## 2021-08-13 DIAGNOSIS — R73.01 IFG (IMPAIRED FASTING GLUCOSE): ICD-10-CM

## 2021-08-13 LAB
CHOLEST SERPL-MCNC: 152 MG/DL (ref 120–199)
CHOLEST/HDLC SERPL: 4.2 {RATIO} (ref 2–5)
ESTIMATED AVG GLUCOSE: 85 MG/DL (ref 68–131)
HBA1C MFR BLD: 4.6 % (ref 4–5.6)
HDLC SERPL-MCNC: 36 MG/DL (ref 40–75)
HDLC SERPL: 23.7 % (ref 20–50)
LDLC SERPL CALC-MCNC: 99.2 MG/DL (ref 63–159)
NONHDLC SERPL-MCNC: 116 MG/DL
TRIGL SERPL-MCNC: 84 MG/DL (ref 30–150)

## 2021-08-13 PROCEDURE — 99215 OFFICE O/P EST HI 40 MIN: CPT | Performed by: STUDENT IN AN ORGANIZED HEALTH CARE EDUCATION/TRAINING PROGRAM

## 2021-08-13 PROCEDURE — 83036 HEMOGLOBIN GLYCOSYLATED A1C: CPT | Performed by: STUDENT IN AN ORGANIZED HEALTH CARE EDUCATION/TRAINING PROGRAM

## 2021-08-13 PROCEDURE — 80061 LIPID PANEL: CPT | Performed by: STUDENT IN AN ORGANIZED HEALTH CARE EDUCATION/TRAINING PROGRAM

## 2021-08-13 PROCEDURE — 36415 COLL VENOUS BLD VENIPUNCTURE: CPT | Performed by: STUDENT IN AN ORGANIZED HEALTH CARE EDUCATION/TRAINING PROGRAM

## 2021-08-13 PROCEDURE — 87389 HIV-1 AG W/HIV-1&-2 AB AG IA: CPT | Performed by: STUDENT IN AN ORGANIZED HEALTH CARE EDUCATION/TRAINING PROGRAM

## 2021-08-13 RX ORDER — PROPRANOLOL HYDROCHLORIDE 60 MG/1
60 TABLET ORAL 3 TIMES DAILY
Qty: 270 TABLET | Refills: 3 | Status: SHIPPED | OUTPATIENT
Start: 2021-08-13 | End: 2021-09-14 | Stop reason: ALTCHOICE

## 2021-08-16 LAB — HIV 1+2 AB+HIV1 P24 AG SERPL QL IA: NEGATIVE

## 2021-08-17 ENCOUNTER — TELEPHONE (OUTPATIENT)
Dept: FAMILY MEDICINE | Facility: HOSPITAL | Age: 31
End: 2021-08-17

## 2021-09-14 ENCOUNTER — HOSPITAL ENCOUNTER (OUTPATIENT)
Dept: CARDIOLOGY | Facility: HOSPITAL | Age: 31
Discharge: HOME | End: 2021-09-14
Attending: STUDENT IN AN ORGANIZED HEALTH CARE EDUCATION/TRAINING PROGRAM
Payer: MEDICAID

## 2021-09-14 ENCOUNTER — OFFICE VISIT (OUTPATIENT)
Dept: FAMILY MEDICINE | Facility: HOSPITAL | Age: 31
End: 2021-09-14
Payer: MEDICAID

## 2021-09-14 VITALS
SYSTOLIC BLOOD PRESSURE: 159 MMHG | WEIGHT: 111.56 LBS | HEIGHT: 67 IN | BODY MASS INDEX: 17.51 KG/M2 | DIASTOLIC BLOOD PRESSURE: 117 MMHG | HEART RATE: 117 BPM

## 2021-09-14 VITALS — BODY MASS INDEX: 17.42 KG/M2 | HEIGHT: 67 IN | WEIGHT: 111 LBS

## 2021-09-14 DIAGNOSIS — E04.1 RIGHT THYROID NODULE: ICD-10-CM

## 2021-09-14 DIAGNOSIS — R76.8 THYROGLOBULIN ANTIBODY POSITIVE: Primary | ICD-10-CM

## 2021-09-14 DIAGNOSIS — F41.1 GAD (GENERALIZED ANXIETY DISORDER): ICD-10-CM

## 2021-09-14 DIAGNOSIS — M79.7 FIBROMYALGIA: ICD-10-CM

## 2021-09-14 DIAGNOSIS — R01.1 MURMUR, CARDIAC: ICD-10-CM

## 2021-09-14 LAB
AORTIC ROOT ANNULUS: 3.19 CM
AV INDEX (PROSTH): 0.83
AV MEAN GRADIENT: 2 MMHG
AV PEAK GRADIENT: 5 MMHG
AV VALVE AREA: 2.28 CM2
AV VELOCITY RATIO: 0.72
BSA FOR ECHO PROCEDURE: 1.54 M2
CV ECHO LV RWT: 0.43 CM
DOP CALC AO PEAK VEL: 1.09 M/S
DOP CALC AO VTI: 20.54 CM
DOP CALC LVOT AREA: 2.7 CM2
DOP CALC LVOT DIAMETER: 1.87 CM
DOP CALC LVOT PEAK VEL: 0.79 M/S
DOP CALC LVOT STROKE VOLUME: 46.89 CM3
DOP CALC MV VTI: 18.41 CM
DOP CALCLVOT PEAK VEL VTI: 17.08 CM
E WAVE DECELERATION TIME: 181.28 MSEC
E/A RATIO: 1.47
E/E' RATIO: 8.7 M/S
ECHO LV POSTERIOR WALL: 0.77 CM (ref 0.6–1.1)
EJECTION FRACTION: 60 %
FRACTIONAL SHORTENING: 34 % (ref 28–44)
INTERVENTRICULAR SEPTUM: 0.75 CM (ref 0.6–1.1)
LA MAJOR: 3.17 CM
LA MINOR: 2.64 CM
LA WIDTH: 2.77 CM
LEFT ATRIUM SIZE: 2.33 CM
LEFT ATRIUM VOLUME INDEX MOD: 8.2 ML/M2
LEFT ATRIUM VOLUME INDEX: 10.1 ML/M2
LEFT ATRIUM VOLUME MOD: 12.93 CM3
LEFT ATRIUM VOLUME: 15.8 CM3
LEFT INTERNAL DIMENSION IN SYSTOLE: 2.36 CM (ref 2.1–4)
LEFT VENTRICLE DIASTOLIC VOLUME INDEX: 34.39 ML/M2
LEFT VENTRICLE DIASTOLIC VOLUME: 53.99 ML
LEFT VENTRICLE MASS INDEX: 47 G/M2
LEFT VENTRICLE SYSTOLIC VOLUME INDEX: 12.3 ML/M2
LEFT VENTRICLE SYSTOLIC VOLUME: 19.27 ML
LEFT VENTRICULAR INTERNAL DIMENSION IN DIASTOLE: 3.59 CM (ref 3.5–6)
LEFT VENTRICULAR MASS: 73.12 G
LV LATERAL E/E' RATIO: 7.25 M/S
LV SEPTAL E/E' RATIO: 10.88 M/S
MV MEAN GRADIENT: 1 MMHG
MV PEAK A VEL: 0.59 M/S
MV PEAK E VEL: 0.87 M/S
MV PEAK GRADIENT: 3 MMHG
MV STENOSIS PRESSURE HALF TIME: 52.57 MS
MV VALVE AREA BY CONTINUITY EQUATION: 2.55 CM2
MV VALVE AREA P 1/2 METHOD: 4.18 CM2
PV PEAK VELOCITY: 0.84 CM/S
RA MAJOR: 3 CM
RA PRESSURE: 8 MMHG
RA WIDTH: 2.19 CM
RIGHT VENTRICULAR END-DIASTOLIC DIMENSION: 2.26 CM
RV TISSUE DOPPLER FREE WALL SYSTOLIC VELOCITY 1 (APICAL 4 CHAMBER VIEW): 8.72 CM/S
TDI LATERAL: 0.12 M/S
TDI SEPTAL: 0.08 M/S
TDI: 0.1 M/S
TRICUSPID ANNULAR PLANE SYSTOLIC EXCURSION: 2.02 CM

## 2021-09-14 PROCEDURE — 99213 OFFICE O/P EST LOW 20 MIN: CPT | Performed by: STUDENT IN AN ORGANIZED HEALTH CARE EDUCATION/TRAINING PROGRAM

## 2021-09-14 PROCEDURE — 93306 TTE W/DOPPLER COMPLETE: CPT

## 2021-09-14 RX ORDER — METHOCARBAMOL 500 MG/1
500 TABLET, FILM COATED ORAL 4 TIMES DAILY PRN
Qty: 40 TABLET | Refills: 0 | Status: SHIPPED | OUTPATIENT
Start: 2021-09-14 | End: 2021-09-24

## 2021-09-14 RX ORDER — ATENOLOL 25 MG/1
25 TABLET ORAL DAILY
Qty: 30 TABLET | Refills: 11 | Status: SHIPPED | OUTPATIENT
Start: 2021-09-14 | End: 2021-10-27

## 2021-09-14 RX ORDER — FLUOXETINE HYDROCHLORIDE 20 MG/1
60 CAPSULE ORAL DAILY
Qty: 270 CAPSULE | Refills: 3 | Status: SHIPPED | OUTPATIENT
Start: 2021-09-14 | End: 2022-08-15 | Stop reason: SDUPTHER

## 2021-09-14 RX ORDER — FLUOXETINE HYDROCHLORIDE 20 MG/1
60 CAPSULE ORAL DAILY
Qty: 270 CAPSULE | Refills: 3 | Status: SHIPPED | OUTPATIENT
Start: 2021-09-14 | End: 2021-09-14

## 2021-09-14 RX ORDER — FLUOXETINE HYDROCHLORIDE 40 MG/1
40 CAPSULE ORAL DAILY
Qty: 90 CAPSULE | Refills: 3 | Status: SHIPPED | OUTPATIENT
Start: 2021-09-14 | End: 2021-09-14

## 2021-10-04 ENCOUNTER — OFFICE VISIT (OUTPATIENT)
Dept: FAMILY MEDICINE | Facility: HOSPITAL | Age: 31
End: 2021-10-04
Payer: MEDICAID

## 2021-10-04 DIAGNOSIS — F41.1 GAD (GENERALIZED ANXIETY DISORDER): ICD-10-CM

## 2021-10-04 DIAGNOSIS — M79.7 FIBROMYALGIA: Primary | ICD-10-CM

## 2021-10-15 ENCOUNTER — OFFICE VISIT (OUTPATIENT)
Dept: OBSTETRICS AND GYNECOLOGY | Facility: CLINIC | Age: 31
End: 2021-10-15
Payer: MEDICAID

## 2021-10-15 VITALS
SYSTOLIC BLOOD PRESSURE: 108 MMHG | DIASTOLIC BLOOD PRESSURE: 70 MMHG | BODY MASS INDEX: 17.56 KG/M2 | WEIGHT: 112.13 LBS

## 2021-10-15 DIAGNOSIS — R10.2 PELVIC PAIN IN FEMALE: ICD-10-CM

## 2021-10-15 DIAGNOSIS — Z30.09 GENERAL COUNSELING AND ADVICE ON FEMALE CONTRACEPTION: ICD-10-CM

## 2021-10-15 DIAGNOSIS — Z12.4 SCREENING FOR CERVICAL CANCER: ICD-10-CM

## 2021-10-15 DIAGNOSIS — Z01.419 WELL WOMAN EXAM WITH ROUTINE GYNECOLOGICAL EXAM: Primary | ICD-10-CM

## 2021-10-15 PROCEDURE — 99999 PR PBB SHADOW E&M-EST. PATIENT-LVL III: CPT | Mod: PBBFAC,,, | Performed by: OBSTETRICS & GYNECOLOGY

## 2021-10-15 PROCEDURE — 99395 PR PREVENTIVE VISIT,EST,18-39: ICD-10-PCS | Mod: S$PBB,,, | Performed by: OBSTETRICS & GYNECOLOGY

## 2021-10-15 PROCEDURE — 88175 CYTOPATH C/V AUTO FLUID REDO: CPT | Performed by: OBSTETRICS & GYNECOLOGY

## 2021-10-15 PROCEDURE — 99999 PR PBB SHADOW E&M-EST. PATIENT-LVL III: ICD-10-PCS | Mod: PBBFAC,,, | Performed by: OBSTETRICS & GYNECOLOGY

## 2021-10-15 PROCEDURE — 99395 PREV VISIT EST AGE 18-39: CPT | Mod: S$PBB,,, | Performed by: OBSTETRICS & GYNECOLOGY

## 2021-10-15 PROCEDURE — 99213 OFFICE O/P EST LOW 20 MIN: CPT | Mod: PBBFAC,PO | Performed by: OBSTETRICS & GYNECOLOGY

## 2021-10-15 RX ORDER — TRIAMCINOLONE ACETONIDE 1 MG/G
OINTMENT TOPICAL
COMMUNITY
Start: 2021-07-22 | End: 2023-08-24

## 2021-10-15 RX ORDER — NORETHINDRONE ACETATE AND ETHINYL ESTRADIOL 1; 5 MG/1; UG/1
1 TABLET ORAL DAILY
Qty: 84 TABLET | Refills: 4 | Status: SHIPPED | OUTPATIENT
Start: 2021-10-15 | End: 2023-01-24

## 2021-10-15 RX ORDER — EPINEPHRINE 0.3 MG/.3ML
0.3 INJECTION SUBCUTANEOUS
COMMUNITY
Start: 2021-07-22 | End: 2023-08-24 | Stop reason: SDUPTHER

## 2021-10-20 LAB
CLINICAL INFO: NORMAL
CYTO CVX: NORMAL
CYTOLOGIST CVX/VAG CYTO: NORMAL
CYTOLOGIST CVX/VAG CYTO: NORMAL
CYTOLOGY CMNT CVX/VAG CYTO-IMP: NORMAL
CYTOLOGY PAP THIN PREP EXPLANATION: NORMAL
DATE OF PREVIOUS PAP: NO
DATE PREVIOUS BX: NO
GEN CATEG CVX/VAG CYTO-IMP: NORMAL
LMP START DATE: NORMAL
MICROORGANISM CVX/VAG CYTO: NORMAL
PATHOLOGIST CVX/VAG CYTO: NORMAL
SERVICE CMNT-IMP: NORMAL
SPECIMEN SOURCE CVX/VAG CYTO: NORMAL
STAT OF ADQ CVX/VAG CYTO-IMP: NORMAL

## 2021-10-21 ENCOUNTER — OFFICE VISIT (OUTPATIENT)
Dept: FAMILY MEDICINE | Facility: HOSPITAL | Age: 31
End: 2021-10-21
Attending: FAMILY MEDICINE
Payer: MEDICAID

## 2021-10-21 VITALS
DIASTOLIC BLOOD PRESSURE: 81 MMHG | WEIGHT: 112.44 LBS | BODY MASS INDEX: 17.65 KG/M2 | HEIGHT: 67 IN | HEART RATE: 118 BPM | SYSTOLIC BLOOD PRESSURE: 111 MMHG

## 2021-10-21 DIAGNOSIS — Z23 FLU VACCINE NEED: ICD-10-CM

## 2021-10-21 DIAGNOSIS — M79.7 FIBROMYALGIA: Primary | ICD-10-CM

## 2021-10-21 PROCEDURE — 99213 OFFICE O/P EST LOW 20 MIN: CPT | Performed by: STUDENT IN AN ORGANIZED HEALTH CARE EDUCATION/TRAINING PROGRAM

## 2021-10-21 PROCEDURE — 90686 IIV4 VACC NO PRSV 0.5 ML IM: CPT

## 2021-10-25 ENCOUNTER — HOSPITAL ENCOUNTER (OUTPATIENT)
Dept: RADIOLOGY | Facility: HOSPITAL | Age: 31
Discharge: HOME | End: 2021-10-25
Attending: OBSTETRICS & GYNECOLOGY
Payer: MEDICAID

## 2021-10-25 DIAGNOSIS — R10.2 PELVIC PAIN IN FEMALE: ICD-10-CM

## 2021-10-25 PROCEDURE — 76830 TRANSVAGINAL US NON-OB: CPT | Mod: 26,,, | Performed by: RADIOLOGY

## 2021-10-25 PROCEDURE — 76856 US EXAM PELVIC COMPLETE: CPT | Mod: 26,,, | Performed by: RADIOLOGY

## 2021-10-25 PROCEDURE — 76856 US PELVIS COMP WITH TRANSVAG NON-OB (XPD): ICD-10-PCS | Mod: 26,,, | Performed by: RADIOLOGY

## 2021-10-25 PROCEDURE — 76856 US EXAM PELVIC COMPLETE: CPT | Mod: TC

## 2021-10-25 PROCEDURE — 76830 US PELVIS COMP WITH TRANSVAG NON-OB (XPD): ICD-10-PCS | Mod: 26,,, | Performed by: RADIOLOGY

## 2021-10-27 ENCOUNTER — PATIENT MESSAGE (OUTPATIENT)
Dept: OBSTETRICS AND GYNECOLOGY | Facility: CLINIC | Age: 31
End: 2021-10-27
Payer: MEDICAID

## 2021-10-27 DIAGNOSIS — N83.209 CYST OF OVARY, UNSPECIFIED LATERALITY: Primary | ICD-10-CM

## 2021-11-09 ENCOUNTER — PATIENT MESSAGE (OUTPATIENT)
Dept: REHABILITATION | Facility: HOSPITAL | Age: 31
End: 2021-11-09

## 2021-11-09 ENCOUNTER — CLINICAL SUPPORT (OUTPATIENT)
Dept: REHABILITATION | Facility: HOSPITAL | Age: 31
End: 2021-11-09
Attending: OBSTETRICS & GYNECOLOGY
Payer: MEDICAID

## 2021-11-09 DIAGNOSIS — M62.89 PELVIC FLOOR DYSFUNCTION: ICD-10-CM

## 2021-11-09 DIAGNOSIS — R10.2 PELVIC PAIN IN FEMALE: ICD-10-CM

## 2021-11-09 PROCEDURE — 97112 NEUROMUSCULAR REEDUCATION: CPT

## 2021-11-09 PROCEDURE — 97162 PT EVAL MOD COMPLEX 30 MIN: CPT

## 2021-11-23 ENCOUNTER — CLINICAL SUPPORT (OUTPATIENT)
Dept: REHABILITATION | Facility: HOSPITAL | Age: 31
End: 2021-11-23
Attending: OBSTETRICS & GYNECOLOGY
Payer: MEDICAID

## 2021-11-23 DIAGNOSIS — M62.89 PELVIC FLOOR DYSFUNCTION: Primary | ICD-10-CM

## 2021-11-23 PROCEDURE — 97112 NEUROMUSCULAR REEDUCATION: CPT

## 2021-12-14 ENCOUNTER — CLINICAL SUPPORT (OUTPATIENT)
Dept: REHABILITATION | Facility: HOSPITAL | Age: 31
End: 2021-12-14
Attending: OBSTETRICS & GYNECOLOGY
Payer: MEDICAID

## 2021-12-14 DIAGNOSIS — M62.89 PELVIC FLOOR DYSFUNCTION: Primary | ICD-10-CM

## 2021-12-14 PROCEDURE — 97112 NEUROMUSCULAR REEDUCATION: CPT

## 2021-12-20 ENCOUNTER — OFFICE VISIT (OUTPATIENT)
Dept: OBSTETRICS AND GYNECOLOGY | Facility: CLINIC | Age: 31
End: 2021-12-20
Payer: MEDICAID

## 2021-12-20 ENCOUNTER — HOSPITAL ENCOUNTER (OUTPATIENT)
Dept: RADIOLOGY | Facility: HOSPITAL | Age: 31
Discharge: HOME | End: 2021-12-20
Attending: OBSTETRICS & GYNECOLOGY
Payer: MEDICAID

## 2021-12-20 DIAGNOSIS — N83.209 CYST OF OVARY, UNSPECIFIED LATERALITY: ICD-10-CM

## 2021-12-20 DIAGNOSIS — N83.201 RIGHT OVARIAN CYST: Primary | ICD-10-CM

## 2021-12-20 PROCEDURE — 99212 OFFICE O/P EST SF 10 MIN: CPT | Mod: PBBFAC,25,PO | Performed by: OBSTETRICS & GYNECOLOGY

## 2021-12-20 PROCEDURE — 76856 US PELVIS COMP WITH TRANSVAG NON-OB (XPD): ICD-10-PCS | Mod: 26,,, | Performed by: RADIOLOGY

## 2021-12-20 PROCEDURE — 76830 US PELVIS COMP WITH TRANSVAG NON-OB (XPD): ICD-10-PCS | Mod: 26,,, | Performed by: RADIOLOGY

## 2021-12-20 PROCEDURE — 76830 TRANSVAGINAL US NON-OB: CPT | Mod: 26,,, | Performed by: RADIOLOGY

## 2021-12-20 PROCEDURE — 99213 PR OFFICE/OUTPT VISIT, EST, LEVL III, 20-29 MIN: ICD-10-PCS | Mod: S$PBB,,, | Performed by: OBSTETRICS & GYNECOLOGY

## 2021-12-20 PROCEDURE — 99999 PR PBB SHADOW E&M-EST. PATIENT-LVL II: ICD-10-PCS | Mod: PBBFAC,,, | Performed by: OBSTETRICS & GYNECOLOGY

## 2021-12-20 PROCEDURE — 76856 US EXAM PELVIC COMPLETE: CPT | Mod: 26,,, | Performed by: RADIOLOGY

## 2021-12-20 PROCEDURE — 99999 PR PBB SHADOW E&M-EST. PATIENT-LVL II: CPT | Mod: PBBFAC,,, | Performed by: OBSTETRICS & GYNECOLOGY

## 2021-12-20 PROCEDURE — 76856 US EXAM PELVIC COMPLETE: CPT | Mod: TC

## 2021-12-20 PROCEDURE — 99213 OFFICE O/P EST LOW 20 MIN: CPT | Mod: S$PBB,,, | Performed by: OBSTETRICS & GYNECOLOGY

## 2021-12-23 ENCOUNTER — IMMUNIZATION (OUTPATIENT)
Dept: INTERNAL MEDICINE | Facility: CLINIC | Age: 31
End: 2021-12-23
Payer: MEDICAID

## 2021-12-23 DIAGNOSIS — Z23 NEED FOR VACCINATION: Primary | ICD-10-CM

## 2021-12-23 PROCEDURE — 0004A COVID-19, MRNA, LNP-S, PF, 30 MCG/0.3 ML DOSE VACCINE: CPT | Mod: PBBFAC,PO

## 2021-12-28 ENCOUNTER — CLINICAL SUPPORT (OUTPATIENT)
Dept: REHABILITATION | Facility: HOSPITAL | Age: 31
End: 2021-12-28
Attending: OBSTETRICS & GYNECOLOGY
Payer: MEDICAID

## 2021-12-28 DIAGNOSIS — M62.89 PELVIC FLOOR DYSFUNCTION: Primary | ICD-10-CM

## 2021-12-28 PROCEDURE — 97140 MANUAL THERAPY 1/> REGIONS: CPT

## 2022-01-18 ENCOUNTER — CLINICAL SUPPORT (OUTPATIENT)
Dept: REHABILITATION | Facility: HOSPITAL | Age: 32
End: 2022-01-18
Attending: OBSTETRICS & GYNECOLOGY
Payer: MEDICAID

## 2022-01-18 DIAGNOSIS — M62.89 PELVIC FLOOR DYSFUNCTION: Primary | ICD-10-CM

## 2022-01-18 PROCEDURE — 97140 MANUAL THERAPY 1/> REGIONS: CPT

## 2022-01-18 NOTE — PROGRESS NOTES
"  Pelvic Health Physical Therapy   Treatment Note     Name: Aury Dupree  Clinic Number: [id number]    Therapy Diagnosis:   Encounter Diagnosis   Name Primary?    Pelvic floor dysfunction Yes     Physician: Liz Eddy MD    Visit Date: 1/18/2022    Physician Orders: PT Eval and Treat    Medical Diagnosis from Referral: Pelvic pain in female [R10.2]  Evaluation Date: 11/9/2021  Authorization Period Expiration: 10/15/2022  Plan of Care Expiration: 2/9/2022  Visit # / Visits authorized: 5/20  Cancelled Visits: 0  No Show Visits: 0    Time In: 11:05  Time Out: 11:45  Total Billable Time: 40 minutes    Precautions: Standard    Subjective     Pt reports: that she used to bowl for YEARS and this may have caused poor hip and pelvic alignment issues!  She has been working on bearing down to lengthen her muscles.  (with and without the dilators).  Also has been able to reduce or eliminate her deep pain at times with L leg postioning for intercourse.  We spoke about her not pushing her muscles beyond what feels good on a particular day, in light of her Fibro diagnosis.  She notes that initial penetration is no longer painful.  She was compliant with home exercise program.  Response to previous treatment: no adverse effects  Functional change: less pain during massage and stretching of the vulvar tissues.      Pain: 0/10      Objective     Aury  rec'd manual therapy to develop desensitization and pelvic alignment for 40 minutes including: contract/relax stretching to the L levator ani and OI.  We worked on PFM downtraining- We focused on her maintaining PFM length after releasing her push- she was able to do this well when concentrating.    She then rec'd TPR to the L OI, .  She was instructed to work on "bowling as a lefty" to help to correct muscle imbalances, and to use the  to her L adductors instead of static stretching.      Home Exercises Provided and Patient Education Provided     Education provided: "   - posture/body mechanices  Discussed progression of plan of care with patient; educated pt in activity modification; reviewed HEP with pt. Pt demonstrated and verbalized understanding of all instruction and was provided with a handout of HEP (see Patient Instructions).    Written Home Exercises Provided: yes.  Exercises were reviewed and Aury was able to demonstrate them prior to the end of the session.  Aury demonstrated good  understanding of the education provided.     See EMR under Patient Instructions for exercises provided 1/18/2022.    Assessment     Pt's symptoms are much improved and she is I with both home exercises and other pain mgmt strategies.  No further need for PT services at this time.        Goals: 12 weeks   Pt will verbalize improved awareness of PFM activity as palpated by PT in order to improve activity involvement with HEP.MET  Pt will report successfully having intercourse with < or = 2/10 pain for an improvement in activity tolerance. MET  Pt will report successful tampon use with < or = 2/10 pain for an improvement in activity tolerance.MET  Pt/family will be independent with HEP for continued self-management of symptoms.MET      Plan     D/c PT    Claire Pickens, PT, BCB-PMD

## 2022-01-18 NOTE — PATIENT INSTRUCTIONS
"Home Program:  1/18/2022    1. Keep stretching!  Don't make your knee hurt.  Make an exercise out of "bowling like a lefty".  Right leg is the planted leg; your trunk rotates to the Right, creating an inflare moment over the R pelvis and Outflare over the L pelvis.  Can do this with or without resistance.  Be gentle at first to establish that this doesn't trigger other aches and pains.      2. Try intercourse with your left leg out to the side propped on pillows.  Muscles should be turned off.  When you find what works, don't overthink it!      3. When bearing down with the dilator: find the L levator ani muscles; push to make those muscles lengthen; hold for 3-5 sec, then SLOWLY back off of the push- the goal is to not have the muscles "snap back" up into the shortened position that they started in.      4. "The Stick" roller (in [employer])- travel size (red handles)  "

## 2022-03-18 ENCOUNTER — PATIENT MESSAGE (OUTPATIENT)
Dept: FAMILY MEDICINE | Facility: HOSPITAL | Age: 32
End: 2022-03-18
Payer: MEDICAID

## 2022-08-15 ENCOUNTER — LAB VISIT (OUTPATIENT)
Dept: LAB | Facility: HOSPITAL | Age: 32
End: 2022-08-15
Attending: NURSE PRACTITIONER
Payer: MEDICAID

## 2022-08-15 ENCOUNTER — OFFICE VISIT (OUTPATIENT)
Dept: PRIMARY CARE CLINIC | Facility: CLINIC | Age: 32
End: 2022-08-15
Payer: MEDICAID

## 2022-08-15 VITALS
OXYGEN SATURATION: 98 % | WEIGHT: 119.38 LBS | SYSTOLIC BLOOD PRESSURE: 118 MMHG | BODY MASS INDEX: 19.19 KG/M2 | DIASTOLIC BLOOD PRESSURE: 82 MMHG | TEMPERATURE: 99 F | HEART RATE: 110 BPM | HEIGHT: 66 IN

## 2022-08-15 DIAGNOSIS — Z13.6 ENCOUNTER FOR LIPID SCREENING FOR CARDIOVASCULAR DISEASE: ICD-10-CM

## 2022-08-15 DIAGNOSIS — Z13.220 ENCOUNTER FOR LIPID SCREENING FOR CARDIOVASCULAR DISEASE: ICD-10-CM

## 2022-08-15 DIAGNOSIS — Z00.00 GENERAL MEDICAL EXAM: ICD-10-CM

## 2022-08-15 DIAGNOSIS — R76.8 THYROGLOBULIN ANTIBODY POSITIVE: ICD-10-CM

## 2022-08-15 DIAGNOSIS — F41.1 GAD (GENERALIZED ANXIETY DISORDER): ICD-10-CM

## 2022-08-15 DIAGNOSIS — M79.7 FIBROMYALGIA: ICD-10-CM

## 2022-08-15 DIAGNOSIS — Z86.39 HISTORY OF METABOLIC AND NUTRITIONAL DISORDER: ICD-10-CM

## 2022-08-15 DIAGNOSIS — J31.0 CHRONIC RHINITIS: ICD-10-CM

## 2022-08-15 DIAGNOSIS — E06.3 HASHIMOTO'S DISEASE: ICD-10-CM

## 2022-08-15 DIAGNOSIS — E06.3 HASHIMOTO'S DISEASE: Primary | ICD-10-CM

## 2022-08-15 PROCEDURE — 1159F PR MEDICATION LIST DOCUMENTED IN MEDICAL RECORD: ICD-10-PCS | Mod: CPTII,,, | Performed by: NURSE PRACTITIONER

## 2022-08-15 PROCEDURE — 80061 LIPID PANEL: CPT | Performed by: NURSE PRACTITIONER

## 2022-08-15 PROCEDURE — 84443 ASSAY THYROID STIM HORMONE: CPT | Performed by: NURSE PRACTITIONER

## 2022-08-15 PROCEDURE — 83036 HEMOGLOBIN GLYCOSYLATED A1C: CPT | Performed by: NURSE PRACTITIONER

## 2022-08-15 PROCEDURE — 99214 OFFICE O/P EST MOD 30 MIN: CPT | Mod: PBBFAC,PN | Performed by: NURSE PRACTITIONER

## 2022-08-15 PROCEDURE — 99214 PR OFFICE/OUTPT VISIT, EST, LEVL IV, 30-39 MIN: ICD-10-PCS | Mod: S$PBB,,, | Performed by: NURSE PRACTITIONER

## 2022-08-15 PROCEDURE — 99214 OFFICE O/P EST MOD 30 MIN: CPT | Mod: S$PBB,,, | Performed by: NURSE PRACTITIONER

## 2022-08-15 PROCEDURE — 3008F BODY MASS INDEX DOCD: CPT | Mod: CPTII,,, | Performed by: NURSE PRACTITIONER

## 2022-08-15 PROCEDURE — 3074F SYST BP LT 130 MM HG: CPT | Mod: CPTII,,, | Performed by: NURSE PRACTITIONER

## 2022-08-15 PROCEDURE — 3008F PR BODY MASS INDEX (BMI) DOCUMENTED: ICD-10-PCS | Mod: CPTII,,, | Performed by: NURSE PRACTITIONER

## 2022-08-15 PROCEDURE — 85025 COMPLETE CBC W/AUTO DIFF WBC: CPT | Performed by: NURSE PRACTITIONER

## 2022-08-15 PROCEDURE — 3044F PR MOST RECENT HEMOGLOBIN A1C LEVEL <7.0%: ICD-10-PCS | Mod: CPTII,,, | Performed by: NURSE PRACTITIONER

## 2022-08-15 PROCEDURE — 80053 COMPREHEN METABOLIC PANEL: CPT | Performed by: NURSE PRACTITIONER

## 2022-08-15 PROCEDURE — 1159F MED LIST DOCD IN RCRD: CPT | Mod: CPTII,,, | Performed by: NURSE PRACTITIONER

## 2022-08-15 PROCEDURE — 3074F PR MOST RECENT SYSTOLIC BLOOD PRESSURE < 130 MM HG: ICD-10-PCS | Mod: CPTII,,, | Performed by: NURSE PRACTITIONER

## 2022-08-15 PROCEDURE — 3079F PR MOST RECENT DIASTOLIC BLOOD PRESSURE 80-89 MM HG: ICD-10-PCS | Mod: CPTII,,, | Performed by: NURSE PRACTITIONER

## 2022-08-15 PROCEDURE — 99999 PR PBB SHADOW E&M-EST. PATIENT-LVL IV: CPT | Mod: PBBFAC,,, | Performed by: NURSE PRACTITIONER

## 2022-08-15 PROCEDURE — 3079F DIAST BP 80-89 MM HG: CPT | Mod: CPTII,,, | Performed by: NURSE PRACTITIONER

## 2022-08-15 PROCEDURE — 36415 COLL VENOUS BLD VENIPUNCTURE: CPT | Mod: PN | Performed by: NURSE PRACTITIONER

## 2022-08-15 PROCEDURE — 84439 ASSAY OF FREE THYROXINE: CPT | Performed by: NURSE PRACTITIONER

## 2022-08-15 PROCEDURE — 99999 PR PBB SHADOW E&M-EST. PATIENT-LVL IV: ICD-10-PCS | Mod: PBBFAC,,, | Performed by: NURSE PRACTITIONER

## 2022-08-15 PROCEDURE — 3044F HG A1C LEVEL LT 7.0%: CPT | Mod: CPTII,,, | Performed by: NURSE PRACTITIONER

## 2022-08-15 PROCEDURE — 86376 MICROSOMAL ANTIBODY EACH: CPT | Performed by: NURSE PRACTITIONER

## 2022-08-15 RX ORDER — FLUOXETINE HYDROCHLORIDE 20 MG/1
60 CAPSULE ORAL DAILY
Qty: 90 CAPSULE | Refills: 3 | Status: SHIPPED | OUTPATIENT
Start: 2022-08-15 | End: 2022-10-18 | Stop reason: SDUPTHER

## 2022-08-15 NOTE — PROGRESS NOTES
Subjective:       Patient ID: Aury Dupree is a 32 y.o. female.    Chief Complaint: [facility] (Est care)      History of Present Illness:   Aury Dupree 32 y.o. female presents today with reports of nasal congestion that is worse during the colder months, abnormal thyroid function, and fibromyalgia. Patient denies any other problems or concerns at this time.     Past Medical History:   Diagnosis Date    Fibromyalgia 09/2021    Last month    Graves disease     Hashimoto's disease     diagnosed this year in march     Family History   Problem Relation Age of Onset    Breast cancer Paternal Grandmother     Colon cancer Neg Hx     Ovarian cancer Neg Hx      Social History     Socioeconomic History    Marital status:    Tobacco Use    Smoking status: Never Smoker    Smokeless tobacco: Never Used   Substance and Sexual Activity    Alcohol use: Never    Drug use: Never    Sexual activity: Yes     Partners: Male     Birth control/protection: OCP, Partner-Vasectomy     Outpatient Encounter Medications as of 8/15/2022   Medication Sig Dispense Refill    diphenhydrAMINE (BENADRYL) 50 MG capsule Take 1 capsule (50 mg total) by mouth nightly as needed for Itching or Insomnia (Anxiety). 30 capsule 0    EPINEPHrine (EPIPEN) 0.3 mg/0.3 mL AtIn Inject 0.3 mg into the muscle as needed.      norethindrone-ethinyl estradiol (FEMHRT 1/5) 1-5 mg-mcg Tab Take 1 tablet by mouth once daily. 84 tablet 4    [DISCONTINUED] FLUoxetine 20 MG capsule Take 3 capsules (60 mg total) by mouth once daily. 270 capsule 3    albuterol (VENTOLIN HFA) 90 mcg/actuation inhaler Inhale 2 puffs into the lungs every 6 (six) hours as needed for Wheezing. Rescue 18 g 12    famotidine (PEPCID) 20 MG tablet Take 20 mg by mouth 2 (two) times daily.      FLUoxetine 20 MG capsule Take 3 capsules (60 mg total) by mouth once daily. 90 capsule 3    fluticasone propionate (FLONASE) 50 mcg/actuation nasal spray       triamcinolone  "acetonide 0.1% (KENALOG) 0.1 % ointment Apply twice a day as needed for itchy raised skin.  Don't use on face, armpits, or groin.       No facility-administered encounter medications on file as of 8/15/2022.       Review of Systems   Constitutional: Negative for appetite change, chills and fever.   HENT: Negative for ear pain, sinus pressure, sore throat and trouble swallowing.    Eyes: Negative for visual disturbance.   Respiratory: Negative for shortness of breath.    Cardiovascular: Negative for chest pain.   Gastrointestinal: Negative for abdominal pain, diarrhea, nausea and vomiting.   Endocrine: Negative for cold intolerance, polyphagia and polyuria.   Genitourinary: Negative for decreased urine volume and dysuria.   Musculoskeletal: Negative for back pain.   Skin: Negative for rash.   Allergic/Immunologic: Negative for environmental allergies and food allergies.   Neurological: Negative for dizziness, tremors, weakness and numbness.   Hematological: Does not bruise/bleed easily.   Psychiatric/Behavioral: Negative for confusion and hallucinations. The patient is not nervous/anxious and is not hyperactive.    All other systems reviewed and are negative.      Objective:      /82 (BP Location: Left arm, Patient Position: Sitting, BP Method: Medium (Manual))   Pulse 110   Temp 98.6 °F (37 °C) (Temporal)   Ht 5' 6" (1.676 m)   Wt 54.2 kg (119 lb 6.4 oz)   SpO2 98%   BMI 19.27 kg/m²   Physical Exam  Vitals and nursing note reviewed.   Constitutional:       Appearance: She is well-developed.   HENT:      Head: Normocephalic and atraumatic.      Right Ear: External ear normal.      Left Ear: External ear normal.      Nose: Nose normal.   Eyes:      Conjunctiva/sclera: Conjunctivae normal.      Pupils: Pupils are equal, round, and reactive to light.   Cardiovascular:      Rate and Rhythm: Normal rate and regular rhythm.      Heart sounds: Normal heart sounds. No murmur heard.  Pulmonary:      Effort: " Pulmonary effort is normal.      Breath sounds: Normal breath sounds. No wheezing.   Abdominal:      General: Bowel sounds are normal.      Palpations: Abdomen is soft.      Tenderness: There is no abdominal tenderness.   Musculoskeletal:         General: Normal range of motion.      Cervical back: Normal range of motion and neck supple.   Skin:     General: Skin is warm and dry.      Findings: No rash.   Neurological:      Mental Status: She is alert and oriented to person, place, and time.      Deep Tendon Reflexes: Reflexes are normal and symmetric.   Psychiatric:         Behavior: Behavior normal.         Thought Content: Thought content normal.         Judgment: Judgment normal.         Results for orders placed or performed in visit on 10/15/21   Pap Smear, Thin Prep with Reflex to HPV   Result Value Ref Range    Cytology ThinPrep Pap Source Cervix     Cytology ThinPrep Pap Report Status DNR     Cytology Thinprep PAP Clinical History Routine exam     Cytology ThinPrep Pap LMP OCP     Cytology ThinPrep Previous PAP No     Cytology ThinPrep Previous Biopsy No     Cytology ThinPrep PAP Adequacy SEE BELOW     Cytology ThinPrep PAP General Categorization DNR     Cytology ThinPrep PAP Interpretation SEE BELOW     Cytology ThinPrep PAP Comment SEE BELOW     Cytotechnologist LEIF PAIGE(ASCP)     Review Cytotechnologist DNR     Pathologist DNR     Cytology ThinPrep PAP Infection DNR     Cytology Thin Prep Pap Explanation SEE BELOW      Assessment:       1. Hashimoto's disease    2. Thyroglobulin antibody positive    3. TACOS (generalized anxiety disorder)    4. Fibromyalgia    5. Chronic rhinitis    6. General medical exam    7. Encounter for lipid screening for cardiovascular disease    8. History of metabolic and nutritional disorder        Plan:   Aury was seen today for establish care.    Diagnoses and all orders for this visit:    Hashimoto's disease  -     TSH; Future  -     T4, Free; Future  -     Thyroid Peroxidase  Antibody; Future    Thyroglobulin antibody positive  -     Thyroid Peroxidase Antibody; Future    TACOS (generalized anxiety disorder)  -     FLUoxetine 20 MG capsule; Take 3 capsules (60 mg total) by mouth once daily.    Fibromyalgia    Chronic rhinitis  -     Ambulatory referral/consult to ENT; Future    General medical exam  -     CBC Auto Differential; Future  -     Comprehensive Metabolic Panel; Future    Encounter for lipid screening for cardiovascular disease  -     Lipid Panel; Future    History of metabolic and nutritional disorder  -     Hemoglobin A1C; Future  -     TSH; Future  -     T4, Free; Future             [facility]   [address] [place] [address] [address]  [place] [address]  Office [phone]  Fax [phone]

## 2022-08-16 ENCOUNTER — TELEPHONE (OUTPATIENT)
Dept: OTOLARYNGOLOGY | Facility: CLINIC | Age: 32
End: 2022-08-16
Payer: MEDICAID

## 2022-08-16 LAB
ALBUMIN SERPL BCP-MCNC: 3.9 G/DL (ref 3.5–5.2)
ALP SERPL-CCNC: 48 U/L (ref 55–135)
ALT SERPL W/O P-5'-P-CCNC: 19 U/L (ref 10–44)
ANION GAP SERPL CALC-SCNC: 10 MMOL/L (ref 8–16)
AST SERPL-CCNC: 15 U/L (ref 10–40)
BASOPHILS # BLD AUTO: 0.03 K/UL (ref 0–0.2)
BASOPHILS NFR BLD: 0.4 % (ref 0–1.9)
BILIRUB SERPL-MCNC: 0.4 MG/DL (ref 0.1–1)
BUN SERPL-MCNC: 10 MG/DL (ref 6–20)
CALCIUM SERPL-MCNC: 9.9 MG/DL (ref 8.7–10.5)
CHLORIDE SERPL-SCNC: 108 MMOL/L (ref 95–110)
CHOLEST SERPL-MCNC: 154 MG/DL (ref 120–199)
CHOLEST/HDLC SERPL: 3.9 {RATIO} (ref 2–5)
CO2 SERPL-SCNC: 23 MMOL/L (ref 23–29)
CREAT SERPL-MCNC: 0.8 MG/DL (ref 0.5–1.4)
DIFFERENTIAL METHOD: ABNORMAL
EOSINOPHIL # BLD AUTO: 0.3 K/UL (ref 0–0.5)
EOSINOPHIL NFR BLD: 4.8 % (ref 0–8)
ERYTHROCYTE [DISTWIDTH] IN BLOOD BY AUTOMATED COUNT: 12.1 % (ref 11.5–14.5)
EST. GFR  (NO RACE VARIABLE): >60 ML/MIN/1.73 M^2
ESTIMATED AVG GLUCOSE: 85 MG/DL (ref 68–131)
GLUCOSE SERPL-MCNC: 93 MG/DL (ref 70–110)
HBA1C MFR BLD: 4.6 % (ref 4–5.6)
HCT VFR BLD AUTO: 40.3 % (ref 37–48.5)
HDLC SERPL-MCNC: 40 MG/DL (ref 40–75)
HDLC SERPL: 26 % (ref 20–50)
HGB BLD-MCNC: 13.7 G/DL (ref 12–16)
IMM GRANULOCYTES # BLD AUTO: 0.01 K/UL (ref 0–0.04)
IMM GRANULOCYTES NFR BLD AUTO: 0.1 % (ref 0–0.5)
LDLC SERPL CALC-MCNC: 98 MG/DL (ref 63–159)
LYMPHOCYTES # BLD AUTO: 1.3 K/UL (ref 1–4.8)
LYMPHOCYTES NFR BLD: 17.7 % (ref 18–48)
MCH RBC QN AUTO: 31.9 PG (ref 27–31)
MCHC RBC AUTO-ENTMCNC: 34 G/DL (ref 32–36)
MCV RBC AUTO: 94 FL (ref 82–98)
MONOCYTES # BLD AUTO: 0.6 K/UL (ref 0.3–1)
MONOCYTES NFR BLD: 7.7 % (ref 4–15)
NEUTROPHILS # BLD AUTO: 4.9 K/UL (ref 1.8–7.7)
NEUTROPHILS NFR BLD: 69.3 % (ref 38–73)
NONHDLC SERPL-MCNC: 114 MG/DL
NRBC BLD-RTO: 0 /100 WBC
PLATELET # BLD AUTO: 271 K/UL (ref 150–450)
PMV BLD AUTO: 9.6 FL (ref 9.2–12.9)
POTASSIUM SERPL-SCNC: 4.1 MMOL/L (ref 3.5–5.1)
PROT SERPL-MCNC: 7.3 G/DL (ref 6–8.4)
RBC # BLD AUTO: 4.3 M/UL (ref 4–5.4)
SODIUM SERPL-SCNC: 141 MMOL/L (ref 136–145)
T4 FREE SERPL-MCNC: 1.02 NG/DL (ref 0.71–1.51)
THYROPEROXIDASE IGG SERPL-ACNC: <6 IU/ML
TRIGL SERPL-MCNC: 80 MG/DL (ref 30–150)
TSH SERPL DL<=0.005 MIU/L-ACNC: 1.49 UIU/ML (ref 0.4–4)
WBC # BLD AUTO: 7.12 K/UL (ref 3.9–12.7)

## 2022-08-19 ENCOUNTER — OFFICE VISIT (OUTPATIENT)
Dept: OTOLARYNGOLOGY | Facility: CLINIC | Age: 32
End: 2022-08-19
Payer: MEDICAID

## 2022-08-19 VITALS
BODY MASS INDEX: 19.07 KG/M2 | WEIGHT: 118.19 LBS | DIASTOLIC BLOOD PRESSURE: 89 MMHG | SYSTOLIC BLOOD PRESSURE: 143 MMHG | HEART RATE: 116 BPM | TEMPERATURE: 98 F

## 2022-08-19 DIAGNOSIS — J31.0 CHRONIC RHINITIS: ICD-10-CM

## 2022-08-19 DIAGNOSIS — J30.0 VASOMOTOR RHINITIS: Primary | ICD-10-CM

## 2022-08-19 DIAGNOSIS — J34.2 NASAL SEPTAL DEVIATION: ICD-10-CM

## 2022-08-19 PROCEDURE — 3079F PR MOST RECENT DIASTOLIC BLOOD PRESSURE 80-89 MM HG: ICD-10-PCS | Mod: CPTII,,, | Performed by: STUDENT IN AN ORGANIZED HEALTH CARE EDUCATION/TRAINING PROGRAM

## 2022-08-19 PROCEDURE — 3044F HG A1C LEVEL LT 7.0%: CPT | Mod: CPTII,,, | Performed by: STUDENT IN AN ORGANIZED HEALTH CARE EDUCATION/TRAINING PROGRAM

## 2022-08-19 PROCEDURE — 3008F PR BODY MASS INDEX (BMI) DOCUMENTED: ICD-10-PCS | Mod: CPTII,,, | Performed by: STUDENT IN AN ORGANIZED HEALTH CARE EDUCATION/TRAINING PROGRAM

## 2022-08-19 PROCEDURE — 99999 PR PBB SHADOW E&M-EST. PATIENT-LVL III: CPT | Mod: PBBFAC,,, | Performed by: STUDENT IN AN ORGANIZED HEALTH CARE EDUCATION/TRAINING PROGRAM

## 2022-08-19 PROCEDURE — 99213 OFFICE O/P EST LOW 20 MIN: CPT | Mod: PBBFAC,25 | Performed by: STUDENT IN AN ORGANIZED HEALTH CARE EDUCATION/TRAINING PROGRAM

## 2022-08-19 PROCEDURE — 3008F BODY MASS INDEX DOCD: CPT | Mod: CPTII,,, | Performed by: STUDENT IN AN ORGANIZED HEALTH CARE EDUCATION/TRAINING PROGRAM

## 2022-08-19 PROCEDURE — 99999 PR PBB SHADOW E&M-EST. PATIENT-LVL III: ICD-10-PCS | Mod: PBBFAC,,, | Performed by: STUDENT IN AN ORGANIZED HEALTH CARE EDUCATION/TRAINING PROGRAM

## 2022-08-19 PROCEDURE — 1159F PR MEDICATION LIST DOCUMENTED IN MEDICAL RECORD: ICD-10-PCS | Mod: CPTII,,, | Performed by: STUDENT IN AN ORGANIZED HEALTH CARE EDUCATION/TRAINING PROGRAM

## 2022-08-19 PROCEDURE — 3044F PR MOST RECENT HEMOGLOBIN A1C LEVEL <7.0%: ICD-10-PCS | Mod: CPTII,,, | Performed by: STUDENT IN AN ORGANIZED HEALTH CARE EDUCATION/TRAINING PROGRAM

## 2022-08-19 PROCEDURE — 3079F DIAST BP 80-89 MM HG: CPT | Mod: CPTII,,, | Performed by: STUDENT IN AN ORGANIZED HEALTH CARE EDUCATION/TRAINING PROGRAM

## 2022-08-19 PROCEDURE — 3077F PR MOST RECENT SYSTOLIC BLOOD PRESSURE >= 140 MM HG: ICD-10-PCS | Mod: CPTII,,, | Performed by: STUDENT IN AN ORGANIZED HEALTH CARE EDUCATION/TRAINING PROGRAM

## 2022-08-19 PROCEDURE — 31231 NASAL ENDOSCOPY DX: CPT | Mod: PBBFAC | Performed by: STUDENT IN AN ORGANIZED HEALTH CARE EDUCATION/TRAINING PROGRAM

## 2022-08-19 PROCEDURE — 31231 NASAL ENDOSCOPY DX: CPT | Mod: S$PBB,,, | Performed by: STUDENT IN AN ORGANIZED HEALTH CARE EDUCATION/TRAINING PROGRAM

## 2022-08-19 PROCEDURE — 3077F SYST BP >= 140 MM HG: CPT | Mod: CPTII,,, | Performed by: STUDENT IN AN ORGANIZED HEALTH CARE EDUCATION/TRAINING PROGRAM

## 2022-08-19 PROCEDURE — 99204 OFFICE O/P NEW MOD 45 MIN: CPT | Mod: 25,S$PBB,, | Performed by: STUDENT IN AN ORGANIZED HEALTH CARE EDUCATION/TRAINING PROGRAM

## 2022-08-19 PROCEDURE — 1159F MED LIST DOCD IN RCRD: CPT | Mod: CPTII,,, | Performed by: STUDENT IN AN ORGANIZED HEALTH CARE EDUCATION/TRAINING PROGRAM

## 2022-08-19 PROCEDURE — 31231 PR NASAL ENDOSCOPY, DX: ICD-10-PCS | Mod: S$PBB,,, | Performed by: STUDENT IN AN ORGANIZED HEALTH CARE EDUCATION/TRAINING PROGRAM

## 2022-08-19 PROCEDURE — 99204 PR OFFICE/OUTPT VISIT, NEW, LEVL IV, 45-59 MIN: ICD-10-PCS | Mod: 25,S$PBB,, | Performed by: STUDENT IN AN ORGANIZED HEALTH CARE EDUCATION/TRAINING PROGRAM

## 2022-08-19 RX ORDER — IPRATROPIUM BROMIDE 21 UG/1
2 SPRAY, METERED NASAL 2 TIMES DAILY
Qty: 30 ML | Refills: 1 | Status: SHIPPED | OUTPATIENT
Start: 2022-08-19 | End: 2023-10-17

## 2022-08-19 RX ORDER — AZELASTINE 1 MG/ML
1 SPRAY, METERED NASAL 2 TIMES DAILY
Qty: 30 ML | Refills: 1 | Status: SHIPPED | OUTPATIENT
Start: 2022-08-19 | End: 2022-10-25 | Stop reason: SDUPTHER

## 2022-08-19 NOTE — PATIENT INSTRUCTIONS
Vasomotor rhinitis     Start Astelin twice daily now  1 week prior to the cold season, start the Atrovent twice daily as well

## 2022-08-19 NOTE — PROGRESS NOTES
Chief complaint:    Chief Complaint   Patient presents with    Sinus Problem         Referring Provider:  Aaareferral Self  No address on file      History of present illness:     Ms. Dupree is a 32 y.o. presenting for evaluation of sinus issues.     She  has been referred by Dr. Martinez.      The patient reports the following allergy/sinus symptoms:       Major symptoms include nasal congestion and clear rhinorrhea. Happens when she is cold. Worse in mornings when it has been cold overnight. Not much trouble over the summer.    Also has some blood on the tissues when she does blow her nose.       No - Purulent anterior nasal discharge  No - Purulent or discolored posterior nasal discharge  No - Nasal congestion or obstruction  No - Facial Congestion or fullness  No - Hyposmia or anosmia  No - Fever    Symptoms have been present for about 3-4 years.   Treatment has included: flonase and OTC allergy medications.  The patient has had about 0 sinus infections in the past 12 months.   Prior sinus surgery: no.    Allergy history: no    Hives with several oral antihistamines, but has taken benadryl without issue    Asthma history: yes - was worse in dry climate of [place]    NSAID/ASA allergy: No     Current smoker:  No       History      Past Medical History:   Past Medical History:   Diagnosis Date    Fibromyalgia 09/2021    Last month    Graves disease     Hashimoto's disease     diagnosed this year in march         Past Surgical History:History reviewed. No pertinent surgical history.      Medications: Medication list reviewed. She  has a current medication list which includes the following prescription(s): diphenhydramine, epinephrine, fluoxetine, fluticasone propionate, norethindrone-ethinyl estradiol, albuterol, famotidine, and triamcinolone acetonide 0.1%.     Allergies:   Review of patient's allergies indicates:   Allergen Reactions    Nuts [tree nut]     Cetirizine Hives    Hydroxyzine Hives     Levocetirizine Hives    Loratadine Hives         Family history: family history includes Breast cancer in her paternal grandmother.         Social History          Alcohol use:  reports no history of alcohol use.            Tobacco:  reports that she has never smoked. She has never used smokeless tobacco.         Physical Examination      Vitals: Blood pressure (!) 143/89, pulse (!) 116, temperature 98.1 °F (36.7 °C), temperature source Temporal, weight 53.6 kg (118 lb 2.7 oz).      General: Well developed, well nourished, well hydrated.     Voice: no dysphonia, no dysarthria      Head/Face: Normocephalic, atraumatic. No scars or lesions. Facial musculature equal.     Eyes: No scleral icterus or conjunctival hemorrhage. EOMI. PERRLA.     Ears:     · Right ear: No gross deformity. EAC is clear of debris and erythema. TM are intact with a pneumatized middle ear. No signs of retraction, fluid or infection.      · Left ear: No gross deformity. EAC is clear of debris and erythema. TM are intact with a pneumatized middle ear. No signs of retraction, fluid or infection.      Nose: No gross deformity or lesions. No purulent discharge. No significant NSD.     Mouth/Oropharynx: Lips without any lesions. No mucosal lesions within the oropharynx. No tonsillar exudate or lesions. Pharyngeal walls symmetrical. Uvula midline. Tongue midline without lesions.     Neurologic: Moving all extremities without gross abnormality.CN II-XII grossly intact. House-Brackmann 1/6. No signs of nystagmus.          Data reviewed      Review of records:      I reviewed records from the referring provider's office visits describing the history, workup, and/or treatment of this problem thus far.     Laboratory:          Imaging:      I have independently reviewed the following imaging with the findings noted below:     none    Procedures:    Procedure Note - Rigid Nasal Endoscopy     Surgeon: Adair Vallejo MD  Anesthesia: topical oxymetazoline  and 4% lidocaine.    Technique: The nose was sprayed with oxymetazoline and 4% lidocaine. With the patient in the upright position, a 2.7mm 30-degree endscope was inserted into the patient's right and left nare.  Where visible, nasal secretions and mucosal crusting were removed with a suction. The overall appearance of the nasal cavity and paranasal sinuses were noted and the findings are described below.     Findings: The nasal septum was intact and was deviated to the left with mid-septal spur.  The inferior turbinates were not enlarged. The head of the left middle turbinate was edematous. There was not any evidence of nasal polyps, masses, or lesions within the nasal cavity.  Mucopurulent drainage was not noted at the middle meatus, frontal recess, or sphenoethmoidal recess.       Assessment/Plan:      Vasomotor rhinitis  Septal deviation  Nasal congestion    We discussed starting Astelin twice daily now  1 week before cold seasons starts she will start Atrovent as well, twice daily  May consider restarting Flonase as well if there is allergic component and minimal improvement with Astelin/Atrovent  She dose have a septal deviation and worse congestion on the left during episodes, but since they are not all the time, if we can get the acute flares under control, then we can avoid septoplasty  Return to clinic 3-4 months to reassess symptoms          Adair Vallejo MD  [facility]   [facility] [address]  [place] [id number]  P: [phone]  F: [phone]

## 2022-12-01 ENCOUNTER — OFFICE VISIT (OUTPATIENT)
Dept: OTOLARYNGOLOGY | Facility: CLINIC | Age: 32
End: 2022-12-01
Payer: MEDICAID

## 2022-12-01 VITALS
DIASTOLIC BLOOD PRESSURE: 109 MMHG | BODY MASS INDEX: 20.99 KG/M2 | WEIGHT: 130.06 LBS | TEMPERATURE: 98 F | SYSTOLIC BLOOD PRESSURE: 168 MMHG | HEART RATE: 121 BPM

## 2022-12-01 DIAGNOSIS — J34.3 HYPERTROPHY OF BOTH INFERIOR NASAL TURBINATES: ICD-10-CM

## 2022-12-01 DIAGNOSIS — J34.2 NASAL SEPTAL DEVIATION: ICD-10-CM

## 2022-12-01 DIAGNOSIS — J34.89 NASAL OBSTRUCTION: ICD-10-CM

## 2022-12-01 DIAGNOSIS — J30.0 VASOMOTOR RHINITIS: Primary | ICD-10-CM

## 2022-12-01 DIAGNOSIS — J32.9 CHRONIC SINUSITIS, UNSPECIFIED LOCATION: ICD-10-CM

## 2022-12-01 PROCEDURE — 99213 OFFICE O/P EST LOW 20 MIN: CPT | Mod: PBBFAC | Performed by: STUDENT IN AN ORGANIZED HEALTH CARE EDUCATION/TRAINING PROGRAM

## 2022-12-01 PROCEDURE — 99213 OFFICE O/P EST LOW 20 MIN: CPT | Mod: S$PBB,25,, | Performed by: STUDENT IN AN ORGANIZED HEALTH CARE EDUCATION/TRAINING PROGRAM

## 2022-12-01 PROCEDURE — 3077F SYST BP >= 140 MM HG: CPT | Mod: CPTII,,, | Performed by: STUDENT IN AN ORGANIZED HEALTH CARE EDUCATION/TRAINING PROGRAM

## 2022-12-01 PROCEDURE — 3080F PR MOST RECENT DIASTOLIC BLOOD PRESSURE >= 90 MM HG: ICD-10-PCS | Mod: CPTII,,, | Performed by: STUDENT IN AN ORGANIZED HEALTH CARE EDUCATION/TRAINING PROGRAM

## 2022-12-01 PROCEDURE — 31231 NASAL ENDOSCOPY DX: CPT | Mod: S$PBB,,, | Performed by: STUDENT IN AN ORGANIZED HEALTH CARE EDUCATION/TRAINING PROGRAM

## 2022-12-01 PROCEDURE — 99999 PR PBB SHADOW E&M-EST. PATIENT-LVL III: ICD-10-PCS | Mod: PBBFAC,,, | Performed by: STUDENT IN AN ORGANIZED HEALTH CARE EDUCATION/TRAINING PROGRAM

## 2022-12-01 PROCEDURE — 31231 NASAL ENDOSCOPY DX: CPT | Mod: PBBFAC | Performed by: STUDENT IN AN ORGANIZED HEALTH CARE EDUCATION/TRAINING PROGRAM

## 2022-12-01 PROCEDURE — 99999 PR PBB SHADOW E&M-EST. PATIENT-LVL III: CPT | Mod: PBBFAC,,, | Performed by: STUDENT IN AN ORGANIZED HEALTH CARE EDUCATION/TRAINING PROGRAM

## 2022-12-01 PROCEDURE — 3080F DIAST BP >= 90 MM HG: CPT | Mod: CPTII,,, | Performed by: STUDENT IN AN ORGANIZED HEALTH CARE EDUCATION/TRAINING PROGRAM

## 2022-12-01 PROCEDURE — 3044F PR MOST RECENT HEMOGLOBIN A1C LEVEL <7.0%: ICD-10-PCS | Mod: CPTII,,, | Performed by: STUDENT IN AN ORGANIZED HEALTH CARE EDUCATION/TRAINING PROGRAM

## 2022-12-01 PROCEDURE — 3077F PR MOST RECENT SYSTOLIC BLOOD PRESSURE >= 140 MM HG: ICD-10-PCS | Mod: CPTII,,, | Performed by: STUDENT IN AN ORGANIZED HEALTH CARE EDUCATION/TRAINING PROGRAM

## 2022-12-01 PROCEDURE — 3008F PR BODY MASS INDEX (BMI) DOCUMENTED: ICD-10-PCS | Mod: CPTII,,, | Performed by: STUDENT IN AN ORGANIZED HEALTH CARE EDUCATION/TRAINING PROGRAM

## 2022-12-01 PROCEDURE — 3044F HG A1C LEVEL LT 7.0%: CPT | Mod: CPTII,,, | Performed by: STUDENT IN AN ORGANIZED HEALTH CARE EDUCATION/TRAINING PROGRAM

## 2022-12-01 PROCEDURE — 99213 PR OFFICE/OUTPT VISIT, EST, LEVL III, 20-29 MIN: ICD-10-PCS | Mod: S$PBB,25,, | Performed by: STUDENT IN AN ORGANIZED HEALTH CARE EDUCATION/TRAINING PROGRAM

## 2022-12-01 PROCEDURE — 3008F BODY MASS INDEX DOCD: CPT | Mod: CPTII,,, | Performed by: STUDENT IN AN ORGANIZED HEALTH CARE EDUCATION/TRAINING PROGRAM

## 2022-12-01 PROCEDURE — 31231 PR NASAL ENDOSCOPY, DX: ICD-10-PCS | Mod: S$PBB,,, | Performed by: STUDENT IN AN ORGANIZED HEALTH CARE EDUCATION/TRAINING PROGRAM

## 2022-12-01 NOTE — PROGRESS NOTES
Chief complaint:    Chief Complaint   Patient presents with    Follow-up         Referring Provider:  No referring provider defined for this encounter.      History of present illness:     Ms. Dupree is a 32 y.o. presenting for evaluation of sinus issues.     She  has been referred by Dr. Ni ref. provider found.      The patient reports the following allergy/sinus symptoms:       Major symptoms include nasal congestion and clear rhinorrhea. Happens when she is cold. Worse in mornings when it has been cold overnight. Not much trouble over the summer.    Also has some blood on the tissues when she does blow her nose.       No - Purulent anterior nasal discharge  No - Purulent or discolored posterior nasal discharge  Yes  - Nasal congestion or obstruction  No - Facial Congestion or fullness  No - Hyposmia or anosmia  No - Fever    Symptoms have been present for about 3-4 years.   Treatment has included: flonase and OTC allergy medications.  The patient has had about 0 sinus infections in the past 12 months.   Prior sinus surgery: no.    Allergy history: no    Hives with several oral antihistamines, but has taken benadryl without issue    Asthma history: yes - was worse in dry climate of [place]    NSAID/ASA allergy: No     Current smoker:  No       Return clinic visit, 12/1/22    Much improved rhinorrhea and obstruction with Astelin, but not resolved. Using mostly BID.    Tried the atrovent when weather got colder, but it would burn her throat.     L>R nasal obstruction, severe during acute episodes in colder wather. Some pressure around the eyes and continued thick, clear rhinorrhea.    History      Past Medical History:   Past Medical History:   Diagnosis Date    Fibromyalgia 09/2021    Last month    Graves disease     Hashimoto's disease     diagnosed this year in march         Past Surgical History:No past surgical history on file.      Medications: Medication list reviewed. She  has a current medication list which  includes the following prescription(s): azelastine, diphenhydramine, epinephrine, fluticasone propionate, ipratropium, albuterol, famotidine, fluoxetine, norethindrone-ethinyl estradiol, and triamcinolone acetonide 0.1%.     Allergies:   Review of patient's allergies indicates:   Allergen Reactions    Nuts [tree nut]     Cetirizine Hives    Hydroxyzine Hives    Levocetirizine Hives    Loratadine Hives         Family history: family history includes Breast cancer in her paternal grandmother.         Social History          Alcohol use:  reports no history of alcohol use.            Tobacco:  reports that she has never smoked. She has never used smokeless tobacco.         Physical Examination      Vitals: Blood pressure (!) 168/109, pulse (!) 121, temperature 98.2 °F (36.8 °C), temperature source Temporal, weight 59 kg (130 lb 1.1 oz).      General: Well developed, well nourished, well hydrated.     Voice: no dysphonia, no dysarthria      Head/Face: Normocephalic, atraumatic. No scars or lesions. Facial musculature equal.     Eyes: No scleral icterus or conjunctival hemorrhage. EOMI. PERRLA.     Ears:     Right ear: No gross deformity. EAC is clear of debris and erythema. TM are intact with a pneumatized middle ear. No signs of retraction, fluid or infection.      Left ear: No gross deformity. EAC is clear of debris and erythema. TM are intact with a pneumatized middle ear. No signs of retraction, fluid or infection.      Nose: No gross deformity or lesions. No purulent discharge.     Mouth/Oropharynx: Lips without any lesions. No mucosal lesions within the oropharynx. No tonsillar exudate or lesions. Pharyngeal walls symmetrical. Uvula midline. Tongue midline without lesions.     Neurologic: Moving all extremities without gross abnormality.CN II-XII grossly intact. House-Brackmann 1/6. No signs of nystagmus.          Data reviewed      Review of records:      I reviewed records from the referring provider's office  visits describing the history, workup, and/or treatment of this problem thus far.     Laboratory:          Imaging:      I have independently reviewed the following imaging with the findings noted below:     none    Procedures:    Procedure Note - Rigid Nasal Endoscopy     Surgeon: Adair Vallejo MD  Anesthesia: topical oxymetazoline and 4% lidocaine.    Technique: The nose was sprayed with oxymetazoline and 4% lidocaine. With the patient in the upright position, a 2.7mm 30-degree endscope was inserted into the patient's right and left nare.  Where visible, nasal secretions and mucosal crusting were removed with a suction. The overall appearance of the nasal cavity and paranasal sinuses were noted and the findings are described below.     Findings: The nasal septum was intact and was deviated to the left with mid-septal spur.  The inferior turbinates were enlarged. Significant polypoid edema both mild turbinates. There was not any evidence of nasal polyps, masses, or lesions within the nasal cavity.  Thick mucoid drainage was noted at the left middle meatus.     Assessment/Plan:    1. Vasomotor rhinitis    2. Nasal septal deviation    3. Chronic sinusitis, unspecified location    4. Nasal obstruction    5. Hypertrophy of both inferior nasal turbinates          We discussed starting Astelin twice daily now  1 week before cold seasons starts she will start Atrovent as well, twice daily  May consider restarting Flonase as well if there is allergic component and minimal improvement with Astelin/Atrovent  She dose have a septal deviation and worse congestion on the left during episodes, but since they are not all the time, if we can get the acute flares under control, then we can avoid septoplasty  Return to clinic 3-4 months to reassess symptoms    Update 12/1/22    Significant polypoid edema both mild turbinates; will proceed with CT sinus   If sinus disease note - treat with prolonged abx/steroids then Return to clinic  3 weeks after completion of treatment  If no sinus disease - will plan for septoplasty  Continue Jennie ZIMMERMAN, justen Vallejo MD  [facility]   [facility]  [id number]  [place] [id number]  P: [phone]  F: [phone]

## 2022-12-09 ENCOUNTER — HOSPITAL ENCOUNTER (OUTPATIENT)
Dept: RADIOLOGY | Facility: HOSPITAL | Age: 32
Discharge: HOME | End: 2022-12-09
Attending: STUDENT IN AN ORGANIZED HEALTH CARE EDUCATION/TRAINING PROGRAM
Payer: MEDICAID

## 2022-12-09 DIAGNOSIS — J32.9 CHRONIC SINUSITIS, UNSPECIFIED LOCATION: ICD-10-CM

## 2022-12-09 DIAGNOSIS — J34.2 NASAL SEPTAL DEVIATION: ICD-10-CM

## 2022-12-09 PROCEDURE — 70486 CT MAXILLOFACIAL W/O DYE: CPT | Mod: TC

## 2022-12-13 ENCOUNTER — PATIENT MESSAGE (OUTPATIENT)
Dept: OTOLARYNGOLOGY | Facility: CLINIC | Age: 32
End: 2022-12-13
Payer: MEDICAID

## 2022-12-13 RX ORDER — PREDNISONE 10 MG/1
TABLET ORAL
Qty: 24 TABLET | Refills: 0 | Status: SHIPPED | OUTPATIENT
Start: 2022-12-13 | End: 2023-08-24

## 2022-12-13 RX ORDER — AMOXICILLIN AND CLAVULANATE POTASSIUM 875; 125 MG/1; MG/1
1 TABLET, FILM COATED ORAL EVERY 12 HOURS
Qty: 42 TABLET | Refills: 0 | Status: SHIPPED | OUTPATIENT
Start: 2022-12-13 | End: 2023-01-03

## 2023-01-05 ENCOUNTER — OFFICE VISIT (OUTPATIENT)
Dept: OTOLARYNGOLOGY | Facility: CLINIC | Age: 33
End: 2023-01-05
Payer: MEDICAID

## 2023-01-05 VITALS
HEART RATE: 117 BPM | WEIGHT: 132.06 LBS | BODY MASS INDEX: 21.31 KG/M2 | TEMPERATURE: 97 F | SYSTOLIC BLOOD PRESSURE: 146 MMHG | DIASTOLIC BLOOD PRESSURE: 92 MMHG

## 2023-01-05 DIAGNOSIS — J30.0 VASOMOTOR RHINITIS: Primary | ICD-10-CM

## 2023-01-05 DIAGNOSIS — J34.89 NASAL OBSTRUCTION: ICD-10-CM

## 2023-01-05 DIAGNOSIS — J34.2 NASAL SEPTAL DEVIATION: ICD-10-CM

## 2023-01-05 DIAGNOSIS — J34.3 HYPERTROPHY OF BOTH INFERIOR NASAL TURBINATES: ICD-10-CM

## 2023-01-05 DIAGNOSIS — J32.9 CHRONIC SINUSITIS, UNSPECIFIED LOCATION: ICD-10-CM

## 2023-01-05 PROCEDURE — 3080F DIAST BP >= 90 MM HG: CPT | Mod: CPTII,,, | Performed by: STUDENT IN AN ORGANIZED HEALTH CARE EDUCATION/TRAINING PROGRAM

## 2023-01-05 PROCEDURE — 3008F BODY MASS INDEX DOCD: CPT | Mod: CPTII,,, | Performed by: STUDENT IN AN ORGANIZED HEALTH CARE EDUCATION/TRAINING PROGRAM

## 2023-01-05 PROCEDURE — 99999 PR PBB SHADOW E&M-EST. PATIENT-LVL III: ICD-10-PCS | Mod: PBBFAC,,, | Performed by: STUDENT IN AN ORGANIZED HEALTH CARE EDUCATION/TRAINING PROGRAM

## 2023-01-05 PROCEDURE — 3077F PR MOST RECENT SYSTOLIC BLOOD PRESSURE >= 140 MM HG: ICD-10-PCS | Mod: CPTII,,, | Performed by: STUDENT IN AN ORGANIZED HEALTH CARE EDUCATION/TRAINING PROGRAM

## 2023-01-05 PROCEDURE — 3077F SYST BP >= 140 MM HG: CPT | Mod: CPTII,,, | Performed by: STUDENT IN AN ORGANIZED HEALTH CARE EDUCATION/TRAINING PROGRAM

## 2023-01-05 PROCEDURE — 1159F MED LIST DOCD IN RCRD: CPT | Mod: CPTII,,, | Performed by: STUDENT IN AN ORGANIZED HEALTH CARE EDUCATION/TRAINING PROGRAM

## 2023-01-05 PROCEDURE — 99999 PR PBB SHADOW E&M-EST. PATIENT-LVL III: CPT | Mod: PBBFAC,,, | Performed by: STUDENT IN AN ORGANIZED HEALTH CARE EDUCATION/TRAINING PROGRAM

## 2023-01-05 PROCEDURE — 3008F PR BODY MASS INDEX (BMI) DOCUMENTED: ICD-10-PCS | Mod: CPTII,,, | Performed by: STUDENT IN AN ORGANIZED HEALTH CARE EDUCATION/TRAINING PROGRAM

## 2023-01-05 PROCEDURE — 99214 PR OFFICE/OUTPT VISIT, EST, LEVL IV, 30-39 MIN: ICD-10-PCS | Mod: S$PBB,,, | Performed by: STUDENT IN AN ORGANIZED HEALTH CARE EDUCATION/TRAINING PROGRAM

## 2023-01-05 PROCEDURE — 99213 OFFICE O/P EST LOW 20 MIN: CPT | Mod: PBBFAC | Performed by: STUDENT IN AN ORGANIZED HEALTH CARE EDUCATION/TRAINING PROGRAM

## 2023-01-05 PROCEDURE — 1159F PR MEDICATION LIST DOCUMENTED IN MEDICAL RECORD: ICD-10-PCS | Mod: CPTII,,, | Performed by: STUDENT IN AN ORGANIZED HEALTH CARE EDUCATION/TRAINING PROGRAM

## 2023-01-05 PROCEDURE — 99214 OFFICE O/P EST MOD 30 MIN: CPT | Mod: S$PBB,,, | Performed by: STUDENT IN AN ORGANIZED HEALTH CARE EDUCATION/TRAINING PROGRAM

## 2023-01-05 PROCEDURE — 3080F PR MOST RECENT DIASTOLIC BLOOD PRESSURE >= 90 MM HG: ICD-10-PCS | Mod: CPTII,,, | Performed by: STUDENT IN AN ORGANIZED HEALTH CARE EDUCATION/TRAINING PROGRAM

## 2023-01-05 RX ORDER — AZELASTINE 1 MG/ML
1 SPRAY, METERED NASAL 2 TIMES DAILY
Qty: 30 ML | Refills: 3 | Status: SHIPPED | OUTPATIENT
Start: 2023-01-05 | End: 2024-01-05

## 2023-01-05 NOTE — PROGRESS NOTES
Chief complaint:    Chief Complaint   Patient presents with    Follow-up    Sinus Problem         Referring Provider:  No referring provider defined for this encounter.      History of present illness:     Ms. Dupree is a 32 y.o. presenting for evaluation of sinus issues.     She  has been referred by Dr. Ni ref. provider found.      The patient reports the following allergy/sinus symptoms:       Major symptoms include nasal congestion and clear rhinorrhea. Happens when she is cold. Worse in mornings when it has been cold overnight. Not much trouble over the summer.    Also has some blood on the tissues when she does blow her nose.       No - Purulent anterior nasal discharge  No - Purulent or discolored posterior nasal discharge  Yes  - Nasal congestion or obstruction  No - Facial Congestion or fullness  No - Hyposmia or anosmia  No - Fever    Symptoms have been present for about 3-4 years.   Treatment has included: flonase and OTC allergy medications.  The patient has had about 0 sinus infections in the past 12 months.   Prior sinus surgery: no.    Allergy history: no    Hives with several oral antihistamines, but has taken benadryl without issue    Asthma history: yes - was worse in dry climate of [place]    NSAID/ASA allergy: No     Current smoker:  No       Return clinic visit, 12/1/22    Much improved rhinorrhea and obstruction with Astelin, but not resolved. Using mostly BID.    Tried the atrovent when weather got colder, but it would burn her throat.     L>R nasal obstruction, severe during acute episodes in colder wather. Some pressure around the eyes and continued thick, clear rhinorrhea.    Return clinic visit, 1/5/23    Completed oral abx and steroids.   All sinonasal symptoms resolved except some residual left sided nasal obstruction, not severe, not all the time.  Still using astelin daily with good result    History      Past Medical History:   Past Medical History:   Diagnosis Date    Fibromyalgia  09/2021    Last month    Graves disease     Hashimoto's disease     diagnosed this year in march         Past Surgical History:No past surgical history on file.      Medications: Medication list reviewed. She  has a current medication list which includes the following prescription(s): azelastine, diphenhydramine, epinephrine, fluticasone propionate, ipratropium, prednisone, albuterol, famotidine, fluoxetine, norethindrone-ethinyl estradiol, and triamcinolone acetonide 0.1%.     Allergies:   Review of patient's allergies indicates:   Allergen Reactions    Nuts [tree nut]     Cetirizine Hives    Hydroxyzine Hives    Levocetirizine Hives    Loratadine Hives         Family history: family history includes Breast cancer in her paternal grandmother.         Social History          Alcohol use:  reports no history of alcohol use.            Tobacco:  reports that she has never smoked. She has never used smokeless tobacco.         Physical Examination      Vitals: Blood pressure (!) 146/92, pulse (!) 117, temperature 97.2 °F (36.2 °C), temperature source Temporal, weight 59.9 kg (132 lb 0.9 oz).      General: Well developed, well nourished, well hydrated.     Voice: no dysphonia, no dysarthria      Head/Face: Normocephalic, atraumatic. No scars or lesions. Facial musculature equal.     Eyes: No scleral icterus or conjunctival hemorrhage. EOMI. PERRLA.     Ears:     Right ear: No gross deformity. EAC is clear of debris and erythema. TM are intact with a pneumatized middle ear. No signs of retraction, fluid or infection.      Left ear: No gross deformity. EAC is clear of debris and erythema. TM are intact with a pneumatized middle ear. No signs of retraction, fluid or infection.      Nose: No gross deformity or lesions. No purulent discharge. Moderate left septal deviation, moderate inferior turbinate hypertrophy     Mouth/Oropharynx: Lips without any lesions. No mucosal lesions within the oropharynx. No tonsillar exudate or  lesions. Pharyngeal walls symmetrical. Uvula midline. Tongue midline without lesions.     Neurologic: Moving all extremities without gross abnormality.CN II-XII grossly intact. House-Brackmann 1/6. No signs of nystagmus.          Data reviewed      Review of records:      I reviewed records from the referring provider's office visits describing the history, workup, and/or treatment of this problem thus far.     Laboratory:          Imaging:      I have independently reviewed the following imaging with the findings noted below:     CT sinus, 12/9/22  Let septal deviation  Inferior turbinate hypertrophy  Bilateral max thickening  Left OMC opacification        Assessment/Plan:    1. Vasomotor rhinitis    2. Nasal septal deviation    3. Chronic sinusitis, unspecified location    4. Nasal obstruction    5. Hypertrophy of both inferior nasal turbinates        We discussed starting Astelin twice daily now  1 week before cold seasons starts she will start Atrovent as well, twice daily  May consider restarting Flonase as well if there is allergic component and minimal improvement with Astelin/Atrovent  She dose have a septal deviation and worse congestion on the left during episodes, but since they are not all the time, if we can get the acute flares under control, then we can avoid septoplasty  Return to clinic 3-4 months to reassess symptoms    Update 12/1/22    Significant polypoid edema both mild turbinates; will proceed with CT sinus   If sinus disease note - treat with prolonged abx/steroids then Return to clinic 3 weeks after completion of treatment  If no sinus disease - will plan for septoplasty  Continue Astelin BID, flonase      Update 12/9/22  Doing much better after prolonged antibiotics and steroids  Continue maintenance with astelin and saline  Return to clinic 3-4 months  Could consider septoplasty/inferior turbinate reduction if obstruction persists          Adair Vallejo MD  [facility]   [facility]  [address]  [place] [address]  P: [phone]  F: [phone]

## 2023-04-13 ENCOUNTER — OFFICE VISIT (OUTPATIENT)
Dept: OTOLARYNGOLOGY | Facility: CLINIC | Age: 33
End: 2023-04-13
Payer: MEDICAID

## 2023-04-13 VITALS
SYSTOLIC BLOOD PRESSURE: 141 MMHG | BODY MASS INDEX: 21.14 KG/M2 | WEIGHT: 130.94 LBS | DIASTOLIC BLOOD PRESSURE: 88 MMHG

## 2023-04-13 DIAGNOSIS — J32.0 CHRONIC MAXILLARY SINUSITIS: ICD-10-CM

## 2023-04-13 DIAGNOSIS — J34.89 NASAL OBSTRUCTION: ICD-10-CM

## 2023-04-13 DIAGNOSIS — J32.2 CHRONIC ETHMOIDAL SINUSITIS: ICD-10-CM

## 2023-04-13 DIAGNOSIS — J34.2 NASAL SEPTAL DEVIATION: Primary | ICD-10-CM

## 2023-04-13 DIAGNOSIS — J34.3 HYPERTROPHY OF BOTH INFERIOR NASAL TURBINATES: ICD-10-CM

## 2023-04-13 PROCEDURE — 31231 NASAL ENDOSCOPY DX: CPT | Mod: S$PBB,,, | Performed by: STUDENT IN AN ORGANIZED HEALTH CARE EDUCATION/TRAINING PROGRAM

## 2023-04-13 PROCEDURE — 3008F PR BODY MASS INDEX (BMI) DOCUMENTED: ICD-10-PCS | Mod: CPTII,,, | Performed by: STUDENT IN AN ORGANIZED HEALTH CARE EDUCATION/TRAINING PROGRAM

## 2023-04-13 PROCEDURE — 3077F PR MOST RECENT SYSTOLIC BLOOD PRESSURE >= 140 MM HG: ICD-10-PCS | Mod: CPTII,,, | Performed by: STUDENT IN AN ORGANIZED HEALTH CARE EDUCATION/TRAINING PROGRAM

## 2023-04-13 PROCEDURE — 99214 PR OFFICE/OUTPT VISIT, EST, LEVL IV, 30-39 MIN: ICD-10-PCS | Mod: S$PBB,25,, | Performed by: STUDENT IN AN ORGANIZED HEALTH CARE EDUCATION/TRAINING PROGRAM

## 2023-04-13 PROCEDURE — 99214 OFFICE O/P EST MOD 30 MIN: CPT | Mod: S$PBB,25,, | Performed by: STUDENT IN AN ORGANIZED HEALTH CARE EDUCATION/TRAINING PROGRAM

## 2023-04-13 PROCEDURE — 3077F SYST BP >= 140 MM HG: CPT | Mod: CPTII,,, | Performed by: STUDENT IN AN ORGANIZED HEALTH CARE EDUCATION/TRAINING PROGRAM

## 2023-04-13 PROCEDURE — 31231 NASAL ENDOSCOPY DX: CPT | Mod: 25,PBBFAC | Performed by: STUDENT IN AN ORGANIZED HEALTH CARE EDUCATION/TRAINING PROGRAM

## 2023-04-13 PROCEDURE — 1159F PR MEDICATION LIST DOCUMENTED IN MEDICAL RECORD: ICD-10-PCS | Mod: CPTII,,, | Performed by: STUDENT IN AN ORGANIZED HEALTH CARE EDUCATION/TRAINING PROGRAM

## 2023-04-13 PROCEDURE — 3079F PR MOST RECENT DIASTOLIC BLOOD PRESSURE 80-89 MM HG: ICD-10-PCS | Mod: CPTII,,, | Performed by: STUDENT IN AN ORGANIZED HEALTH CARE EDUCATION/TRAINING PROGRAM

## 2023-04-13 PROCEDURE — 99214 OFFICE O/P EST MOD 30 MIN: CPT | Mod: PBBFAC,25 | Performed by: STUDENT IN AN ORGANIZED HEALTH CARE EDUCATION/TRAINING PROGRAM

## 2023-04-13 PROCEDURE — 3079F DIAST BP 80-89 MM HG: CPT | Mod: CPTII,,, | Performed by: STUDENT IN AN ORGANIZED HEALTH CARE EDUCATION/TRAINING PROGRAM

## 2023-04-13 PROCEDURE — 31231 PR NASAL ENDOSCOPY, DX: ICD-10-PCS | Mod: S$PBB,,, | Performed by: STUDENT IN AN ORGANIZED HEALTH CARE EDUCATION/TRAINING PROGRAM

## 2023-04-13 PROCEDURE — 99999 PR PBB SHADOW E&M-EST. PATIENT-LVL IV: CPT | Mod: PBBFAC,,, | Performed by: STUDENT IN AN ORGANIZED HEALTH CARE EDUCATION/TRAINING PROGRAM

## 2023-04-13 PROCEDURE — 1159F MED LIST DOCD IN RCRD: CPT | Mod: CPTII,,, | Performed by: STUDENT IN AN ORGANIZED HEALTH CARE EDUCATION/TRAINING PROGRAM

## 2023-04-13 PROCEDURE — 3008F BODY MASS INDEX DOCD: CPT | Mod: CPTII,,, | Performed by: STUDENT IN AN ORGANIZED HEALTH CARE EDUCATION/TRAINING PROGRAM

## 2023-04-13 PROCEDURE — 99999 PR PBB SHADOW E&M-EST. PATIENT-LVL IV: ICD-10-PCS | Mod: PBBFAC,,, | Performed by: STUDENT IN AN ORGANIZED HEALTH CARE EDUCATION/TRAINING PROGRAM

## 2023-04-13 NOTE — PROGRESS NOTES
Chief complaint:    Chief Complaint   Patient presents with    Sinus Problem         Referring Provider:  No referring provider defined for this encounter.      History of present illness:     Ms. Dupree is a 32 y.o. presenting for evaluation of sinus issues.     She  has been referred by Dr. Ni ref. provider found.      The patient reports the following allergy/sinus symptoms:       Major symptoms include nasal congestion and clear rhinorrhea. Happens when she is cold. Worse in mornings when it has been cold overnight. Not much trouble over the summer.    Also has some blood on the tissues when she does blow her nose.       No - Purulent anterior nasal discharge  No - Purulent or discolored posterior nasal discharge  Yes  - Nasal congestion or obstruction  No - Facial Congestion or fullness  No - Hyposmia or anosmia  No - Fever    Symptoms have been present for about 3-4 years.   Treatment has included: flonase and OTC allergy medications.  The patient has had about 0 sinus infections in the past 12 months.   Prior sinus surgery: no.    Allergy history: no    Hives with several oral antihistamines, but has taken benadryl without issue    Asthma history: yes - was worse in dry climate of [place]    NSAID/ASA allergy: No     Current smoker:  No       Return clinic visit, 12/1/22    Much improved rhinorrhea and obstruction with Astelin, but not resolved. Using mostly BID.    Tried the atrovent when weather got colder, but it would burn her throat.     L>R nasal obstruction, severe during acute episodes in colder wather. Some pressure around the eyes and continued thick, clear rhinorrhea.    Return clinic visit, 1/5/23  Completed oral abx and steroids.   All sinonasal symptoms resolved except some residual left sided nasal obstruction, not severe, not all the time.  Still using astelin daily with good result    Return clinic visit, 4/13/23    Continued nasal obstruction (L>R), purulent post nasal drip.    Did well with  antibiotics. Then some symptoms started to return - PND, sore throat, nasal obstruction.     Present now for many months with fluctuating severity, but never completely resovled.     Has used saline, atrovent, flosae, astelin all consistently without resolution of symptoms.     History      Past Medical History:   Past Medical History:   Diagnosis Date    Fibromyalgia 09/2021    Last month    Graves disease     Hashimoto's disease     diagnosed this year in march         Past Surgical History:No past surgical history on file.      Medications: Medication list reviewed. She  has a current medication list which includes the following prescription(s): albuterol, azelastine, diphenhydramine, epinephrine, famotidine, fluoxetine, fluticasone propionate, ipratropium, norethindrone-ethinyl estradiol, prednisone, and triamcinolone acetonide 0.1%.     Allergies:   Review of patient's allergies indicates:   Allergen Reactions    Nuts [tree nut]     Cetirizine Hives    Hydroxyzine Hives    Levocetirizine Hives    Loratadine Hives         Family history: family history includes Breast cancer in her paternal grandmother.         Social History          Alcohol use:  reports no history of alcohol use.            Tobacco:  reports that she has never smoked. She has never used smokeless tobacco.         Physical Examination      Vitals: Blood pressure (!) 141/88, weight 59.4 kg (130 lb 15.3 oz).      General: Well developed, well nourished, well hydrated.     Voice: no dysphonia, no dysarthria      Head/Face: Normocephalic, atraumatic. No scars or lesions. Facial musculature equal.     Eyes: No scleral icterus or conjunctival hemorrhage. EOMI. PERRLA.     Ears:     Right ear: No gross deformity. EAC is clear of debris and erythema. TM are intact with a pneumatized middle ear. No signs of retraction, fluid or infection.      Left ear: No gross deformity. EAC is clear of debris and erythema. TM are intact with a pneumatized middle  ear. No signs of retraction, fluid or infection.      Nose: No gross deformity or lesions. No purulent discharge. Significant  left septal deviation, inferior turbinate hypertrophy     Mouth/Oropharynx: Lips without any lesions. No mucosal lesions within the oropharynx. No tonsillar exudate or lesions. Pharyngeal walls symmetrical. Uvula midline. Tongue midline without lesions.     Neurologic: Moving all extremities without gross abnormality.CN II-XII grossly intact. House-Brackmann 1/6. No signs of nystagmus.          Data reviewed      Review of records:      I reviewed records from the referring provider's office visits describing the history, workup, and/or treatment of this problem thus far.     Laboratory:          Imaging:      I have independently reviewed the following imaging with the findings noted below:     CT sinus, 12/9/22  Let septal deviation  Inferior turbinate hypertrophy  Bilateral max thickening  Left OMC opacification  Left anterior ethmoid opacification      Procedure Note - Rigid Nasal Endoscopy     Surgeon: Adair Vallejo MD  Anesthesia: topical oxymetazoline and 4% lidocaine.    Technique: The nose was sprayed with oxymetazoline and 4% lidocaine. With the patient in the upright position, a 2.7mm 30-degree endscope was inserted into the patient's right and left nare.  Where visible, nasal secretions and mucosal crusting were removed with a suction. The overall appearance of the nasal cavity and paranasal sinuses were noted and the findings are described below.     Findings: The nasal septum was intact and was deviated significantly to the left.  The inferior turbinates were enlarged.  Polypoid thickening of bilateral middle turbinates. Severe edema left middle meatus. Mucopurulent drainage was noted at the left middle meatus                Assessment/Plan:    1. Nasal septal deviation    2. Nasal obstruction    3. Hypertrophy of both inferior nasal turbinates    4. Chronic maxillary sinusitis     5. Chronic ethmoidal sinusitis          Update 4/13/23  Aury has chronic sinusitis and has been on maximal medical therapy as outlined in the HPI.  My recommendation is for endoscopic sinus surgery including left maxillary antrostomy, left anterior ethmoidectomy, polypectomy, septoplasty, inferior turbinate reduction.  We discussed the need for postoperative debridements as well as chronic allergy management postoperatively.    Risks of sinus surgery were discussed including, but not limited to bleeding, infection, scar, lack of improvement or recurrence of symptoms, cerebrospinal fluid leak, injury to eyes. Additional risks of septoplasty include septal perforation, upper teeth and gum numbness, and nasal collapse were discussed.           Adair Vallejo MD  [facility]   [facility] [address]  [place] [address]  P: [phone]  F: [phone]

## 2023-05-25 ENCOUNTER — PATIENT MESSAGE (OUTPATIENT)
Dept: SURGERY | Facility: HOSPITAL | Age: 33
End: 2023-05-25
Payer: MEDICAID

## 2023-05-26 NOTE — TELEPHONE ENCOUNTER
FESS (functional endoscopic sinus surgery), septoplasty, inferior turbinate reduction. The only different procedure that some surgeons may offer balloon procedures, which I do not think she would be a good candidate for and would expect insufficient improvement. She is welcome to seek a second opinion if she would like.

## 2023-05-30 NOTE — TELEPHONE ENCOUNTER
"Lot of specific details go into that, but basically the sinuses involved do not respond will to the balloon. The inferior turbinate reduction is a "submucosal resection" of the inferior turbinates."

## 2023-05-30 NOTE — TELEPHONE ENCOUNTER
I am moving this pt's surgery but she has questions about why she is not a candidate for balloon procedure and what method you use for turbs.

## 2023-06-14 ENCOUNTER — PATIENT MESSAGE (OUTPATIENT)
Dept: PRIMARY CARE CLINIC | Facility: CLINIC | Age: 33
End: 2023-06-14
Payer: MEDICAID

## 2023-06-15 DIAGNOSIS — F41.1 GAD (GENERALIZED ANXIETY DISORDER): ICD-10-CM

## 2023-06-16 ENCOUNTER — OFFICE VISIT (OUTPATIENT)
Dept: PRIMARY CARE CLINIC | Facility: CLINIC | Age: 33
End: 2023-06-16
Payer: MEDICAID

## 2023-06-16 ENCOUNTER — PATIENT MESSAGE (OUTPATIENT)
Dept: PRIMARY CARE CLINIC | Facility: CLINIC | Age: 33
End: 2023-06-16

## 2023-06-16 DIAGNOSIS — Z00.00 GENERAL MEDICAL EXAM: ICD-10-CM

## 2023-06-16 DIAGNOSIS — Z13.6 ENCOUNTER FOR LIPID SCREENING FOR CARDIOVASCULAR DISEASE: ICD-10-CM

## 2023-06-16 DIAGNOSIS — F41.1 GAD (GENERALIZED ANXIETY DISORDER): ICD-10-CM

## 2023-06-16 DIAGNOSIS — E06.3 HASHIMOTO'S DISEASE: Primary | ICD-10-CM

## 2023-06-16 DIAGNOSIS — Z13.220 ENCOUNTER FOR LIPID SCREENING FOR CARDIOVASCULAR DISEASE: ICD-10-CM

## 2023-06-16 DIAGNOSIS — Z86.39 HISTORY OF METABOLIC AND NUTRITIONAL DISORDER: ICD-10-CM

## 2023-06-16 PROCEDURE — 99214 OFFICE O/P EST MOD 30 MIN: CPT | Mod: 95,,, | Performed by: NURSE PRACTITIONER

## 2023-06-16 PROCEDURE — 99214 PR OFFICE/OUTPT VISIT, EST, LEVL IV, 30-39 MIN: ICD-10-PCS | Mod: 95,,, | Performed by: NURSE PRACTITIONER

## 2023-06-16 PROCEDURE — 1160F PR REVIEW ALL MEDS BY PRESCRIBER/CLIN PHARMACIST DOCUMENTED: ICD-10-PCS | Mod: CPTII,95,, | Performed by: NURSE PRACTITIONER

## 2023-06-16 PROCEDURE — 1159F PR MEDICATION LIST DOCUMENTED IN MEDICAL RECORD: ICD-10-PCS | Mod: CPTII,95,, | Performed by: NURSE PRACTITIONER

## 2023-06-16 PROCEDURE — 1160F RVW MEDS BY RX/DR IN RCRD: CPT | Mod: CPTII,95,, | Performed by: NURSE PRACTITIONER

## 2023-06-16 PROCEDURE — 1159F MED LIST DOCD IN RCRD: CPT | Mod: CPTII,95,, | Performed by: NURSE PRACTITIONER

## 2023-06-16 RX ORDER — FLUOXETINE HYDROCHLORIDE 20 MG/1
60 CAPSULE ORAL DAILY
Qty: 90 CAPSULE | Refills: 0 | Status: SHIPPED | OUTPATIENT
Start: 2023-06-16 | End: 2023-07-10

## 2023-06-16 NOTE — PROGRESS NOTES
Subjective:       Patient ID: Aury Dupree is a 32 y.o. female.    Chief Complaint: Medication Management and Refill for Depression  The patient location is:[place], [place]    Visit type: audiovisual-Synchronous      Face to Face time with patient: 11 min  12 minutes of total time spent on the encounter, which includes face to face time and non-face to face time preparing to see the patient (eg, review of tests), Obtaining and/or reviewing separately obtained history, Documenting clinical information in the electronic or other health record, Independently interpreting results (not separately reported) and communicating results to the patient/family/caregiver, or Care coordination (not separately reported).         Each patient to whom he or she provides medical services by telemedicine is:  (1) informed of the relationship between the physician and patient and the respective role of any other health care provider with respect to management of the patient; and (2) notified that he or she may decline to receive medical services by telemedicine and may withdraw from such care at any time.       History of Present Illness:   Aury Dupree 32 y.o. female presents today for medication management and refills for depression. Treatment options and alternatives were discussed with the patient. Patient provided opportunity to ask additional questions.  All questions were answered. Voices understanding and acceptance of this advice. Instructed to call back if any further questions or concerns.      Past Medical History:   Diagnosis Date    Fibromyalgia 09/2021    Last month    Graves disease     Hashimoto's disease     diagnosed this year in march     Family History   Problem Relation Age of Onset    Breast cancer Paternal Grandmother     Colon cancer Neg Hx     Ovarian cancer Neg Hx      Social History     Socioeconomic History    Marital status:    Tobacco Use    Smoking status: Never    Smokeless tobacco: Never    Substance and Sexual Activity    Alcohol use: Never    Drug use: Never    Sexual activity: Yes     Partners: Male     Birth control/protection: OCP, Partner-Vasectomy     Outpatient Encounter Medications as of 6/16/2023   Medication Sig Dispense Refill    albuterol (VENTOLIN HFA) 90 mcg/actuation inhaler Inhale 2 puffs into the lungs every 6 (six) hours as needed for Wheezing. Rescue 18 g 12    azelastine (ASTELIN) 137 mcg (0.1 %) nasal spray 1 spray (137 mcg total) by Nasal route 2 (two) times daily. 30 mL 3    diphenhydrAMINE (BENADRYL) 50 MG capsule Take 1 capsule (50 mg total) by mouth nightly as needed for Itching or Insomnia (Anxiety). 30 capsule 0    EPINEPHrine (EPIPEN) 0.3 mg/0.3 mL AtIn Inject 0.3 mg into the muscle as needed.      famotidine (PEPCID) 20 MG tablet Take 20 mg by mouth 2 (two) times daily.      FLUoxetine 20 MG capsule Take 3 capsules (60 mg total) by mouth once daily. 90 capsule 0    fluticasone propionate (FLONASE) 50 mcg/actuation nasal spray       ipratropium (ATROVENT) 21 mcg (0.03 %) nasal spray 2 sprays by Nasal route 2 (two) times daily. 30 mL 1    norethindrone-ethinyl estradiol (FEMHRT 1/5) 1-5 mg-mcg Tab Take 1 tablet by mouth once daily 84 tablet 0    predniSONE (DELTASONE) 10 MG tablet Take 3 tablets a day for 4 days (1 before breakfast, 1 after lunch, 1 before bed). Then take 2 tablets a day for 4 days (1 before breakfast, 1 before bed). Then take 1 tablet a day for 4 days (1 before breakfast). 24 tablet 0    triamcinolone acetonide 0.1% (KENALOG) 0.1 % ointment Apply twice a day as needed for itchy raised skin.  Don't use on face, armpits, or groin.      [DISCONTINUED] FLUoxetine 20 MG capsule Take 3 capsules (60 mg total) by mouth once daily. 90 capsule 3     No facility-administered encounter medications on file as of 6/16/2023.       Review of Systems   Constitutional:  Negative for activity change and unexpected weight change.   HENT:  Negative for hearing loss,  rhinorrhea and trouble swallowing.    Eyes:  Negative for discharge and visual disturbance.   Respiratory:  Negative for chest tightness and wheezing.    Cardiovascular:  Negative for chest pain and palpitations.   Gastrointestinal:  Negative for blood in stool, constipation, diarrhea and vomiting.   Endocrine: Negative for polydipsia and polyuria.   Genitourinary:  Negative for difficulty urinating, dysuria, hematuria and menstrual problem.   Musculoskeletal:  Negative for arthralgias, joint swelling and neck pain.   Neurological:  Positive for weakness. Negative for headaches.   Psychiatric/Behavioral:  Positive for dysphoric mood. Negative for confusion.      Objective:      There were no vitals taken for this visit.  Physical Exam  Constitutional:       Appearance: Normal appearance.   Neurological:      Mental Status: She is alert.       Results for orders placed or performed in visit on 08/15/22   CBC Auto Differential   Result Value Ref Range    WBC 7.12 3.90 - 12.70 K/uL    RBC 4.30 4.00 - 5.40 M/uL    Hemoglobin 13.7 12.0 - 16.0 g/dL    Hematocrit 40.3 37.0 - 48.5 %    MCV 94 82 - 98 fL    MCH 31.9 (H) 27.0 - 31.0 pg    MCHC 34.0 32.0 - 36.0 g/dL    RDW 12.1 11.5 - 14.5 %    Platelets 271 150 - 450 K/uL    MPV 9.6 9.2 - 12.9 fL    Immature Granulocytes 0.1 0.0 - 0.5 %    Gran # (ANC) 4.9 1.8 - 7.7 K/uL    Immature Grans (Abs) 0.01 0.00 - 0.04 K/uL    Lymph # 1.3 1.0 - 4.8 K/uL    Mono # 0.6 0.3 - 1.0 K/uL    Eos # 0.3 0.0 - 0.5 K/uL    Baso # 0.03 0.00 - 0.20 K/uL    nRBC 0 0 /100 WBC    Gran % 69.3 38.0 - 73.0 %    Lymph % 17.7 (L) 18.0 - 48.0 %    Mono % 7.7 4.0 - 15.0 %    Eosinophil % 4.8 0.0 - 8.0 %    Basophil % 0.4 0.0 - 1.9 %    Differential Method Automated    Comprehensive Metabolic Panel   Result Value Ref Range    Sodium 141 136 - 145 mmol/L    Potassium 4.1 3.5 - 5.1 mmol/L    Chloride 108 95 - 110 mmol/L    CO2 23 23 - 29 mmol/L    Glucose 93 70 - 110 mg/dL    BUN 10 6 - 20 mg/dL    Creatinine  0.8 0.5 - 1.4 mg/dL    Calcium 9.9 8.7 - 10.5 mg/dL    Total Protein 7.3 6.0 - 8.4 g/dL    Albumin 3.9 3.5 - 5.2 g/dL    Total Bilirubin 0.4 0.1 - 1.0 mg/dL    Alkaline Phosphatase 48 (L) 55 - 135 U/L    AST 15 10 - 40 U/L    ALT 19 10 - 44 U/L    Anion Gap 10 8 - 16 mmol/L    eGFR >60.0 >60 mL/min/1.73 m^2   Lipid Panel   Result Value Ref Range    Cholesterol 154 120 - 199 mg/dL    Triglycerides 80 30 - 150 mg/dL    HDL 40 40 - 75 mg/dL    LDL Cholesterol 98.0 63.0 - 159.0 mg/dL    HDL/Cholesterol Ratio 26.0 20.0 - 50.0 %    Total Cholesterol/HDL Ratio 3.9 2.0 - 5.0    Non-HDL Cholesterol 114 mg/dL   Hemoglobin A1C   Result Value Ref Range    Hemoglobin A1C 4.6 4.0 - 5.6 %    Estimated Avg Glucose 85 68 - 131 mg/dL   TSH   Result Value Ref Range    TSH 1.486 0.400 - 4.000 uIU/mL   T4, Free   Result Value Ref Range    Free T4 1.02 0.71 - 1.51 ng/dL   Thyroid Peroxidase Antibody   Result Value Ref Range    Thyroperoxidase Antibodies <6.0 <6.0 IU/mL     Assessment:       1. Hashimoto's disease    2. TACOS (generalized anxiety disorder)    3. General medical exam    4. Encounter for lipid screening for cardiovascular disease    5. History of metabolic and nutritional disorder        Plan:   Diagnoses and all orders for this visit:    Hashimoto's disease  -     T4, Free; Future  -     TSH; Future    TACOS (generalized anxiety disorder)  -     FLUoxetine 20 MG capsule; Take 3 capsules (60 mg total) by mouth once daily.    General medical exam  -     Comprehensive Metabolic Panel; Future  -     CBC Auto Differential; Future    Encounter for lipid screening for cardiovascular disease  -     Lipid Panel; Future    History of metabolic and nutritional disorder  -     Hemoglobin A1C; Future            [facility] [address]  [place] [address]  Office [phone]  Fax [phone]

## 2023-06-19 ENCOUNTER — LAB VISIT (OUTPATIENT)
Dept: LAB | Facility: HOSPITAL | Age: 33
End: 2023-06-19
Attending: STUDENT IN AN ORGANIZED HEALTH CARE EDUCATION/TRAINING PROGRAM
Payer: MEDICAID

## 2023-06-19 DIAGNOSIS — J34.2 NASAL SEPTAL DEVIATION: ICD-10-CM

## 2023-06-19 DIAGNOSIS — Z86.39 HISTORY OF METABOLIC AND NUTRITIONAL DISORDER: ICD-10-CM

## 2023-06-19 DIAGNOSIS — E06.3 HASHIMOTO'S DISEASE: ICD-10-CM

## 2023-06-19 DIAGNOSIS — Z00.00 GENERAL MEDICAL EXAM: ICD-10-CM

## 2023-06-19 DIAGNOSIS — Z13.6 ENCOUNTER FOR LIPID SCREENING FOR CARDIOVASCULAR DISEASE: ICD-10-CM

## 2023-06-19 DIAGNOSIS — Z13.220 ENCOUNTER FOR LIPID SCREENING FOR CARDIOVASCULAR DISEASE: ICD-10-CM

## 2023-06-19 LAB
ALBUMIN SERPL BCP-MCNC: 3.9 G/DL (ref 3.5–5.2)
ALBUMIN SERPL BCP-MCNC: 3.9 G/DL (ref 3.5–5.2)
ALP SERPL-CCNC: 58 U/L (ref 55–135)
ALP SERPL-CCNC: 58 U/L (ref 55–135)
ALT SERPL W/O P-5'-P-CCNC: 13 U/L (ref 10–44)
ALT SERPL W/O P-5'-P-CCNC: 13 U/L (ref 10–44)
ANION GAP SERPL CALC-SCNC: 9 MMOL/L (ref 8–16)
ANION GAP SERPL CALC-SCNC: 9 MMOL/L (ref 8–16)
AST SERPL-CCNC: 17 U/L (ref 10–40)
AST SERPL-CCNC: 17 U/L (ref 10–40)
BASOPHILS # BLD AUTO: 0.03 K/UL (ref 0–0.2)
BASOPHILS # BLD AUTO: 0.03 K/UL (ref 0–0.2)
BASOPHILS NFR BLD: 0.4 % (ref 0–1.9)
BASOPHILS NFR BLD: 0.4 % (ref 0–1.9)
BILIRUB SERPL-MCNC: 0.4 MG/DL (ref 0.1–1)
BILIRUB SERPL-MCNC: 0.4 MG/DL (ref 0.1–1)
BUN SERPL-MCNC: 13 MG/DL (ref 6–20)
BUN SERPL-MCNC: 13 MG/DL (ref 6–20)
CALCIUM SERPL-MCNC: 9.8 MG/DL (ref 8.7–10.5)
CALCIUM SERPL-MCNC: 9.8 MG/DL (ref 8.7–10.5)
CHLORIDE SERPL-SCNC: 107 MMOL/L (ref 95–110)
CHLORIDE SERPL-SCNC: 107 MMOL/L (ref 95–110)
CHOLEST SERPL-MCNC: 170 MG/DL (ref 120–199)
CHOLEST/HDLC SERPL: 4.1 {RATIO} (ref 2–5)
CO2 SERPL-SCNC: 23 MMOL/L (ref 23–29)
CO2 SERPL-SCNC: 23 MMOL/L (ref 23–29)
CREAT SERPL-MCNC: 0.8 MG/DL (ref 0.5–1.4)
CREAT SERPL-MCNC: 0.8 MG/DL (ref 0.5–1.4)
DIFFERENTIAL METHOD: ABNORMAL
DIFFERENTIAL METHOD: ABNORMAL
EOSINOPHIL # BLD AUTO: 0.4 K/UL (ref 0–0.5)
EOSINOPHIL # BLD AUTO: 0.4 K/UL (ref 0–0.5)
EOSINOPHIL NFR BLD: 5.7 % (ref 0–8)
EOSINOPHIL NFR BLD: 5.7 % (ref 0–8)
ERYTHROCYTE [DISTWIDTH] IN BLOOD BY AUTOMATED COUNT: 12.4 % (ref 11.5–14.5)
ERYTHROCYTE [DISTWIDTH] IN BLOOD BY AUTOMATED COUNT: 12.4 % (ref 11.5–14.5)
EST. GFR  (NO RACE VARIABLE): >60 ML/MIN/1.73 M^2
EST. GFR  (NO RACE VARIABLE): >60 ML/MIN/1.73 M^2
ESTIMATED AVG GLUCOSE: 85 MG/DL (ref 68–131)
GLUCOSE SERPL-MCNC: 83 MG/DL (ref 70–110)
GLUCOSE SERPL-MCNC: 83 MG/DL (ref 70–110)
HBA1C MFR BLD: 4.6 % (ref 4–5.6)
HCT VFR BLD AUTO: 41.5 % (ref 37–48.5)
HCT VFR BLD AUTO: 41.5 % (ref 37–48.5)
HDLC SERPL-MCNC: 41 MG/DL (ref 40–75)
HDLC SERPL: 24.1 % (ref 20–50)
HGB BLD-MCNC: 13.7 G/DL (ref 12–16)
HGB BLD-MCNC: 13.7 G/DL (ref 12–16)
IMM GRANULOCYTES # BLD AUTO: 0.02 K/UL (ref 0–0.04)
IMM GRANULOCYTES # BLD AUTO: 0.02 K/UL (ref 0–0.04)
IMM GRANULOCYTES NFR BLD AUTO: 0.3 % (ref 0–0.5)
IMM GRANULOCYTES NFR BLD AUTO: 0.3 % (ref 0–0.5)
LDLC SERPL CALC-MCNC: 118.2 MG/DL (ref 63–159)
LYMPHOCYTES # BLD AUTO: 1.8 K/UL (ref 1–4.8)
LYMPHOCYTES # BLD AUTO: 1.8 K/UL (ref 1–4.8)
LYMPHOCYTES NFR BLD: 25.7 % (ref 18–48)
LYMPHOCYTES NFR BLD: 25.7 % (ref 18–48)
MCH RBC QN AUTO: 32.1 PG (ref 27–31)
MCH RBC QN AUTO: 32.1 PG (ref 27–31)
MCHC RBC AUTO-ENTMCNC: 33 G/DL (ref 32–36)
MCHC RBC AUTO-ENTMCNC: 33 G/DL (ref 32–36)
MCV RBC AUTO: 97 FL (ref 82–98)
MCV RBC AUTO: 97 FL (ref 82–98)
MONOCYTES # BLD AUTO: 0.5 K/UL (ref 0.3–1)
MONOCYTES # BLD AUTO: 0.5 K/UL (ref 0.3–1)
MONOCYTES NFR BLD: 7 % (ref 4–15)
MONOCYTES NFR BLD: 7 % (ref 4–15)
NEUTROPHILS # BLD AUTO: 4.2 K/UL (ref 1.8–7.7)
NEUTROPHILS # BLD AUTO: 4.2 K/UL (ref 1.8–7.7)
NEUTROPHILS NFR BLD: 60.9 % (ref 38–73)
NEUTROPHILS NFR BLD: 60.9 % (ref 38–73)
NONHDLC SERPL-MCNC: 129 MG/DL
NRBC BLD-RTO: 0 /100 WBC
NRBC BLD-RTO: 0 /100 WBC
PLATELET # BLD AUTO: 258 K/UL (ref 150–450)
PLATELET # BLD AUTO: 258 K/UL (ref 150–450)
PMV BLD AUTO: 10.6 FL (ref 9.2–12.9)
PMV BLD AUTO: 10.6 FL (ref 9.2–12.9)
POTASSIUM SERPL-SCNC: 3.9 MMOL/L (ref 3.5–5.1)
POTASSIUM SERPL-SCNC: 3.9 MMOL/L (ref 3.5–5.1)
PROT SERPL-MCNC: 7.3 G/DL (ref 6–8.4)
PROT SERPL-MCNC: 7.3 G/DL (ref 6–8.4)
RBC # BLD AUTO: 4.27 M/UL (ref 4–5.4)
RBC # BLD AUTO: 4.27 M/UL (ref 4–5.4)
SODIUM SERPL-SCNC: 139 MMOL/L (ref 136–145)
SODIUM SERPL-SCNC: 139 MMOL/L (ref 136–145)
T4 FREE SERPL-MCNC: 0.99 NG/DL (ref 0.71–1.51)
TRIGL SERPL-MCNC: 54 MG/DL (ref 30–150)
TSH SERPL DL<=0.005 MIU/L-ACNC: 1.85 UIU/ML (ref 0.4–4)
WBC # BLD AUTO: 6.88 K/UL (ref 3.9–12.7)
WBC # BLD AUTO: 6.88 K/UL (ref 3.9–12.7)

## 2023-06-19 PROCEDURE — 36415 COLL VENOUS BLD VENIPUNCTURE: CPT | Mod: PN | Performed by: NURSE PRACTITIONER

## 2023-06-19 PROCEDURE — 84443 ASSAY THYROID STIM HORMONE: CPT | Performed by: NURSE PRACTITIONER

## 2023-06-19 PROCEDURE — 80053 COMPREHEN METABOLIC PANEL: CPT | Performed by: STUDENT IN AN ORGANIZED HEALTH CARE EDUCATION/TRAINING PROGRAM

## 2023-06-19 PROCEDURE — 84439 ASSAY OF FREE THYROXINE: CPT | Performed by: NURSE PRACTITIONER

## 2023-06-19 PROCEDURE — 80061 LIPID PANEL: CPT | Performed by: NURSE PRACTITIONER

## 2023-06-19 PROCEDURE — 83036 HEMOGLOBIN GLYCOSYLATED A1C: CPT | Performed by: NURSE PRACTITIONER

## 2023-06-19 PROCEDURE — 85025 COMPLETE CBC W/AUTO DIFF WBC: CPT | Performed by: STUDENT IN AN ORGANIZED HEALTH CARE EDUCATION/TRAINING PROGRAM

## 2023-07-10 DIAGNOSIS — Z30.09 GENERAL COUNSELING AND ADVICE ON FEMALE CONTRACEPTION: ICD-10-CM

## 2023-07-10 RX ORDER — NORETHINDRONE ACETATE AND ETHINYL ESTRADIOL 1; 5 MG/1; UG/1
1 TABLET ORAL DAILY
Qty: 84 TABLET | Refills: 2 | Status: SHIPPED | OUTPATIENT
Start: 2023-07-10 | End: 2023-08-24 | Stop reason: SDUPTHER

## 2023-08-07 ENCOUNTER — OFFICE VISIT (OUTPATIENT)
Dept: PRIMARY CARE CLINIC | Facility: CLINIC | Age: 33
End: 2023-08-07
Payer: MEDICAID

## 2023-08-07 VITALS
TEMPERATURE: 98 F | WEIGHT: 128.63 LBS | SYSTOLIC BLOOD PRESSURE: 118 MMHG | BODY MASS INDEX: 20.67 KG/M2 | HEART RATE: 115 BPM | DIASTOLIC BLOOD PRESSURE: 72 MMHG | OXYGEN SATURATION: 98 % | HEIGHT: 66 IN

## 2023-08-07 DIAGNOSIS — R01.1 MURMUR, CARDIAC: ICD-10-CM

## 2023-08-07 DIAGNOSIS — R42 DIZZINESS: ICD-10-CM

## 2023-08-07 DIAGNOSIS — R00.0 TACHYCARDIA: ICD-10-CM

## 2023-08-07 DIAGNOSIS — F41.1 GAD (GENERALIZED ANXIETY DISORDER): ICD-10-CM

## 2023-08-07 DIAGNOSIS — E06.3 HASHIMOTO'S DISEASE: Primary | ICD-10-CM

## 2023-08-07 PROCEDURE — 3078F DIAST BP <80 MM HG: CPT | Mod: CPTII,,, | Performed by: NURSE PRACTITIONER

## 2023-08-07 PROCEDURE — 3074F PR MOST RECENT SYSTOLIC BLOOD PRESSURE < 130 MM HG: ICD-10-PCS | Mod: CPTII,,, | Performed by: NURSE PRACTITIONER

## 2023-08-07 PROCEDURE — 99999 PR PBB SHADOW E&M-EST. PATIENT-LVL V: ICD-10-PCS | Mod: PBBFAC,,, | Performed by: NURSE PRACTITIONER

## 2023-08-07 PROCEDURE — 3008F BODY MASS INDEX DOCD: CPT | Mod: CPTII,,, | Performed by: NURSE PRACTITIONER

## 2023-08-07 PROCEDURE — 3008F PR BODY MASS INDEX (BMI) DOCUMENTED: ICD-10-PCS | Mod: CPTII,,, | Performed by: NURSE PRACTITIONER

## 2023-08-07 PROCEDURE — 3078F PR MOST RECENT DIASTOLIC BLOOD PRESSURE < 80 MM HG: ICD-10-PCS | Mod: CPTII,,, | Performed by: NURSE PRACTITIONER

## 2023-08-07 PROCEDURE — 1159F MED LIST DOCD IN RCRD: CPT | Mod: CPTII,,, | Performed by: NURSE PRACTITIONER

## 2023-08-07 PROCEDURE — 3044F PR MOST RECENT HEMOGLOBIN A1C LEVEL <7.0%: ICD-10-PCS | Mod: CPTII,,, | Performed by: NURSE PRACTITIONER

## 2023-08-07 PROCEDURE — 1160F PR REVIEW ALL MEDS BY PRESCRIBER/CLIN PHARMACIST DOCUMENTED: ICD-10-PCS | Mod: CPTII,,, | Performed by: NURSE PRACTITIONER

## 2023-08-07 PROCEDURE — 3074F SYST BP LT 130 MM HG: CPT | Mod: CPTII,,, | Performed by: NURSE PRACTITIONER

## 2023-08-07 PROCEDURE — 99214 PR OFFICE/OUTPT VISIT, EST, LEVL IV, 30-39 MIN: ICD-10-PCS | Mod: S$PBB,,, | Performed by: NURSE PRACTITIONER

## 2023-08-07 PROCEDURE — 3044F HG A1C LEVEL LT 7.0%: CPT | Mod: CPTII,,, | Performed by: NURSE PRACTITIONER

## 2023-08-07 PROCEDURE — 1160F RVW MEDS BY RX/DR IN RCRD: CPT | Mod: CPTII,,, | Performed by: NURSE PRACTITIONER

## 2023-08-07 PROCEDURE — 99215 OFFICE O/P EST HI 40 MIN: CPT | Mod: PBBFAC,PN | Performed by: NURSE PRACTITIONER

## 2023-08-07 PROCEDURE — 1159F PR MEDICATION LIST DOCUMENTED IN MEDICAL RECORD: ICD-10-PCS | Mod: CPTII,,, | Performed by: NURSE PRACTITIONER

## 2023-08-07 PROCEDURE — 99214 OFFICE O/P EST MOD 30 MIN: CPT | Mod: S$PBB,,, | Performed by: NURSE PRACTITIONER

## 2023-08-07 PROCEDURE — 99999 PR PBB SHADOW E&M-EST. PATIENT-LVL V: CPT | Mod: PBBFAC,,, | Performed by: NURSE PRACTITIONER

## 2023-08-07 RX ORDER — ATENOLOL 25 MG/1
25 TABLET ORAL DAILY
Qty: 30 TABLET | Refills: 2 | Status: SHIPPED | OUTPATIENT
Start: 2023-08-07 | End: 2023-10-06 | Stop reason: SDUPTHER

## 2023-08-07 NOTE — PROGRESS NOTES
Subjective:       Patient ID: Aury Dupree is a 33 y.o. female.    Chief Complaint: Follow-up and Annual Exam (Weakness increase heart rate)      History of Present Illness:   Aury Dupree 33 y.o. female presents to clinic for medication management and refills for Hashimoto's, Murmur, and dizziness. Denies any other problems or concerns at this time. Treatment options and alternatives were discussed with the patient. Patient provided opportunity to ask additional questions.  All questions were answered. Voices understanding and acceptance of this advice. Instructed to call back if any further questions or concerns.     Past Medical History:   Diagnosis Date    Fibromyalgia 09/2021    Last month    Graves disease     Hashimoto's disease     diagnosed this year in march     Family History   Problem Relation Age of Onset    Breast cancer Paternal Grandmother     Colon cancer Neg Hx     Ovarian cancer Neg Hx      Social History     Socioeconomic History    Marital status:    Tobacco Use    Smoking status: Never    Smokeless tobacco: Never   Substance and Sexual Activity    Alcohol use: Never    Drug use: Never    Sexual activity: Yes     Partners: Male     Birth control/protection: OCP, Partner-Vasectomy     Outpatient Encounter Medications as of 8/7/2023   Medication Sig Dispense Refill    azelastine (ASTELIN) 137 mcg (0.1 %) nasal spray 1 spray (137 mcg total) by Nasal route 2 (two) times daily. 30 mL 3    norethindrone-ethinyl estradiol (FEMHRT 1/5) 1-5 mg-mcg Tab Take 1 tablet by mouth once daily. 84 tablet 2    [DISCONTINUED] FLUoxetine 20 MG capsule TAKE 3 CAPSULES BY MOUTH ONCE DAILY 90 capsule 0    albuterol (VENTOLIN HFA) 90 mcg/actuation inhaler Inhale 2 puffs into the lungs every 6 (six) hours as needed for Wheezing. Rescue 18 g 12    atenoloL (TENORMIN) 25 MG tablet Take 1 tablet (25 mg total) by mouth once daily. 30 tablet 2    diphenhydrAMINE (BENADRYL) 50 MG capsule Take 1 capsule (50 mg total)  by mouth nightly as needed for Itching or Insomnia (Anxiety). (Patient not taking: Reported on 8/7/2023) 30 capsule 0    EPINEPHrine (EPIPEN) 0.3 mg/0.3 mL AtIn Inject 0.3 mg into the muscle as needed.      famotidine (PEPCID) 20 MG tablet Take 20 mg by mouth 2 (two) times daily.      FLUoxetine 20 MG capsule Take 3 capsules (60 mg total) by mouth once daily. 90 capsule 3    fluticasone propionate (FLONASE) 50 mcg/actuation nasal spray       ipratropium (ATROVENT) 21 mcg (0.03 %) nasal spray 2 sprays by Nasal route 2 (two) times daily. (Patient not taking: Reported on 8/7/2023) 30 mL 1    predniSONE (DELTASONE) 10 MG tablet Take 3 tablets a day for 4 days (1 before breakfast, 1 after lunch, 1 before bed). Then take 2 tablets a day for 4 days (1 before breakfast, 1 before bed). Then take 1 tablet a day for 4 days (1 before breakfast). (Patient not taking: Reported on 8/7/2023) 24 tablet 0    triamcinolone acetonide 0.1% (KENALOG) 0.1 % ointment Apply twice a day as needed for itchy raised skin.  Don't use on face, armpits, or groin.      [DISCONTINUED] FLUoxetine 20 MG capsule Take 3 capsules (60 mg total) by mouth once daily. 90 capsule 0    [DISCONTINUED] norethindrone-ethinyl estradiol (FEMHRT 1/5) 1-5 mg-mcg Tab Take 1 tablet by mouth once daily 84 tablet 0     No facility-administered encounter medications on file as of 8/7/2023.       Review of Systems   Constitutional:  Negative for activity change and unexpected weight change.   HENT:  Negative for hearing loss, rhinorrhea and trouble swallowing.    Eyes:  Negative for discharge and visual disturbance.   Respiratory:  Negative for chest tightness and wheezing.    Cardiovascular:  Positive for palpitations. Negative for chest pain.   Gastrointestinal:  Negative for blood in stool, constipation, diarrhea and vomiting.   Endocrine: Negative for polydipsia and polyuria.   Genitourinary:  Negative for difficulty urinating, dysuria, hematuria and menstrual problem.  "  Musculoskeletal:  Negative for arthralgias, joint swelling and neck pain.   Neurological:  Positive for weakness. Negative for headaches.   Psychiatric/Behavioral:  Negative for confusion and dysphoric mood.        Objective:      /72 (BP Location: Left arm, Patient Position: Sitting, BP Method: Medium (Manual))   Pulse (!) 115   Temp 98.2 °F (36.8 °C) (Oral)   Ht 5' 6" (1.676 m)   Wt 58.3 kg (128 lb 9.6 oz)   SpO2 98%   BMI 20.76 kg/m²   Physical Exam  Vitals and nursing note reviewed.   Constitutional:       General: She is not in acute distress.     Appearance: Normal appearance. She is normal weight. She is not ill-appearing or toxic-appearing.   Cardiovascular:      Rate and Rhythm: Normal rate and regular rhythm.      Pulses: Normal pulses.      Heart sounds: Normal heart sounds.   Pulmonary:      Effort: Pulmonary effort is normal.      Breath sounds: Normal breath sounds.   Neurological:      Mental Status: She is alert.         Results for orders placed or performed in visit on 06/19/23   CBC auto differential   Result Value Ref Range    WBC 6.88 3.90 - 12.70 K/uL    RBC 4.27 4.00 - 5.40 M/uL    Hemoglobin 13.7 12.0 - 16.0 g/dL    Hematocrit 41.5 37.0 - 48.5 %    MCV 97 82 - 98 fL    MCH 32.1 (H) 27.0 - 31.0 pg    MCHC 33.0 32.0 - 36.0 g/dL    RDW 12.4 11.5 - 14.5 %    Platelets 258 150 - 450 K/uL    MPV 10.6 9.2 - 12.9 fL    Immature Granulocytes 0.3 0.0 - 0.5 %    Gran # (ANC) 4.2 1.8 - 7.7 K/uL    Immature Grans (Abs) 0.02 0.00 - 0.04 K/uL    Lymph # 1.8 1.0 - 4.8 K/uL    Mono # 0.5 0.3 - 1.0 K/uL    Eos # 0.4 0.0 - 0.5 K/uL    Baso # 0.03 0.00 - 0.20 K/uL    nRBC 0 0 /100 WBC    Gran % 60.9 38.0 - 73.0 %    Lymph % 25.7 18.0 - 48.0 %    Mono % 7.0 4.0 - 15.0 %    Eosinophil % 5.7 0.0 - 8.0 %    Basophil % 0.4 0.0 - 1.9 %    Differential Method Automated    COMPREHENSIVE METABOLIC PANEL   Result Value Ref Range    Sodium 139 136 - 145 mmol/L    Potassium 3.9 3.5 - 5.1 mmol/L    Chloride 107 " 95 - 110 mmol/L    CO2 23 23 - 29 mmol/L    Glucose 83 70 - 110 mg/dL    BUN 13 6 - 20 mg/dL    Creatinine 0.8 0.5 - 1.4 mg/dL    Calcium 9.8 8.7 - 10.5 mg/dL    Total Protein 7.3 6.0 - 8.4 g/dL    Albumin 3.9 3.5 - 5.2 g/dL    Total Bilirubin 0.4 0.1 - 1.0 mg/dL    Alkaline Phosphatase 58 55 - 135 U/L    AST 17 10 - 40 U/L    ALT 13 10 - 44 U/L    Anion Gap 9 8 - 16 mmol/L    eGFR >60.0 >60 mL/min/1.73 m^2   Hemoglobin A1C   Result Value Ref Range    Hemoglobin A1C 4.6 4.0 - 5.6 %    Estimated Avg Glucose 85 68 - 131 mg/dL   T4, Free   Result Value Ref Range    Free T4 0.99 0.71 - 1.51 ng/dL   TSH   Result Value Ref Range    TSH 1.853 0.400 - 4.000 uIU/mL   Lipid Panel   Result Value Ref Range    Cholesterol 170 120 - 199 mg/dL    Triglycerides 54 30 - 150 mg/dL    HDL 41 40 - 75 mg/dL    LDL Cholesterol 118.2 63.0 - 159.0 mg/dL    HDL/Cholesterol Ratio 24.1 20.0 - 50.0 %    Total Cholesterol/HDL Ratio 4.1 2.0 - 5.0    Non-HDL Cholesterol 129 mg/dL   Comprehensive Metabolic Panel   Result Value Ref Range    Sodium 139 136 - 145 mmol/L    Potassium 3.9 3.5 - 5.1 mmol/L    Chloride 107 95 - 110 mmol/L    CO2 23 23 - 29 mmol/L    Glucose 83 70 - 110 mg/dL    BUN 13 6 - 20 mg/dL    Creatinine 0.8 0.5 - 1.4 mg/dL    Calcium 9.8 8.7 - 10.5 mg/dL    Total Protein 7.3 6.0 - 8.4 g/dL    Albumin 3.9 3.5 - 5.2 g/dL    Total Bilirubin 0.4 0.1 - 1.0 mg/dL    Alkaline Phosphatase 58 55 - 135 U/L    AST 17 10 - 40 U/L    ALT 13 10 - 44 U/L    Anion Gap 9 8 - 16 mmol/L    eGFR >60.0 >60 mL/min/1.73 m^2   CBC Auto Differential   Result Value Ref Range    WBC 6.88 3.90 - 12.70 K/uL    RBC 4.27 4.00 - 5.40 M/uL    Hemoglobin 13.7 12.0 - 16.0 g/dL    Hematocrit 41.5 37.0 - 48.5 %    MCV 97 82 - 98 fL    MCH 32.1 (H) 27.0 - 31.0 pg    MCHC 33.0 32.0 - 36.0 g/dL    RDW 12.4 11.5 - 14.5 %    Platelets 258 150 - 450 K/uL    MPV 10.6 9.2 - 12.9 fL    Immature Granulocytes 0.3 0.0 - 0.5 %    Gran # (ANC) 4.2 1.8 - 7.7 K/uL    Immature Grans  (Abs) 0.02 0.00 - 0.04 K/uL    Lymph # 1.8 1.0 - 4.8 K/uL    Mono # 0.5 0.3 - 1.0 K/uL    Eos # 0.4 0.0 - 0.5 K/uL    Baso # 0.03 0.00 - 0.20 K/uL    nRBC 0 0 /100 WBC    Gran % 60.9 38.0 - 73.0 %    Lymph % 25.7 18.0 - 48.0 %    Mono % 7.0 4.0 - 15.0 %    Eosinophil % 5.7 0.0 - 8.0 %    Basophil % 0.4 0.0 - 1.9 %    Differential Method Automated      Assessment:       1. Hashimoto's disease    2. Murmur, cardiac    3. Tachycardia    4. Dizziness    5. TACOS (generalized anxiety disorder)        Plan:   Aury was seen today for follow-up and annual exam.    Diagnoses and all orders for this visit:    Hashimoto's disease    Murmur, cardiac    Tachycardia  -     Ambulatory referral/consult to Cardiology; Future    Dizziness    TACOS (generalized anxiety disorder)  -     FLUoxetine 20 MG capsule; Take 3 capsules (60 mg total) by mouth once daily.    Other orders  -     atenoloL (TENORMIN) 25 MG tablet; Take 1 tablet (25 mg total) by mouth once daily.              [facility] [address]  [place] [address]  Office [phone]  Fax [phone]

## 2023-08-08 ENCOUNTER — CLINICAL SUPPORT (OUTPATIENT)
Dept: INTERNAL MEDICINE | Facility: CLINIC | Age: 33
End: 2023-08-08
Payer: MEDICAID

## 2023-08-08 DIAGNOSIS — Z23 NEED FOR TDAP VACCINATION: Primary | ICD-10-CM

## 2023-08-08 PROCEDURE — 99999 PR PBB SHADOW E&M-EST. PATIENT-LVL II: CPT | Mod: PBBFAC,,,

## 2023-08-08 PROCEDURE — 99212 OFFICE O/P EST SF 10 MIN: CPT | Mod: PBBFAC,PN

## 2023-08-08 PROCEDURE — 99999 PR PBB SHADOW E&M-EST. PATIENT-LVL II: ICD-10-PCS | Mod: PBBFAC,,,

## 2023-08-08 PROCEDURE — 99999PBSHW TDAP VACCINE GREATER THAN OR EQUAL TO 7YO IM: ICD-10-PCS | Mod: PBBFAC,,,

## 2023-08-08 PROCEDURE — 90715 TDAP VACCINE 7 YRS/> IM: CPT | Mod: PBBFAC,PN | Performed by: NURSE PRACTITIONER

## 2023-08-08 PROCEDURE — 99999PBSHW TDAP VACCINE GREATER THAN OR EQUAL TO 7YO IM: Mod: PBBFAC,,,

## 2023-08-08 RX ORDER — FLUOXETINE HYDROCHLORIDE 20 MG/1
60 CAPSULE ORAL DAILY
Qty: 90 CAPSULE | Refills: 3 | Status: SHIPPED | OUTPATIENT
Start: 2023-08-08 | End: 2023-10-17

## 2023-08-22 ENCOUNTER — TELEPHONE (OUTPATIENT)
Dept: OBSTETRICS AND GYNECOLOGY | Facility: CLINIC | Age: 33
End: 2023-08-22
Payer: MEDICAID

## 2023-08-24 ENCOUNTER — OFFICE VISIT (OUTPATIENT)
Dept: OBSTETRICS AND GYNECOLOGY | Facility: CLINIC | Age: 33
End: 2023-08-24
Payer: MEDICAID

## 2023-08-24 VITALS
BODY MASS INDEX: 20.58 KG/M2 | DIASTOLIC BLOOD PRESSURE: 80 MMHG | WEIGHT: 128.06 LBS | SYSTOLIC BLOOD PRESSURE: 124 MMHG | HEIGHT: 66 IN

## 2023-08-24 DIAGNOSIS — Z01.419 WELL WOMAN EXAM WITH ROUTINE GYNECOLOGICAL EXAM: Primary | ICD-10-CM

## 2023-08-24 DIAGNOSIS — Z12.4 SCREENING FOR CERVICAL CANCER: ICD-10-CM

## 2023-08-24 DIAGNOSIS — Z30.09 GENERAL COUNSELING AND ADVICE ON FEMALE CONTRACEPTION: ICD-10-CM

## 2023-08-24 PROCEDURE — 88175 CYTOPATH C/V AUTO FLUID REDO: CPT | Performed by: OBSTETRICS & GYNECOLOGY

## 2023-08-24 PROCEDURE — 99999 PR PBB SHADOW E&M-EST. PATIENT-LVL III: ICD-10-PCS | Mod: PBBFAC,,, | Performed by: OBSTETRICS & GYNECOLOGY

## 2023-08-24 PROCEDURE — 1159F PR MEDICATION LIST DOCUMENTED IN MEDICAL RECORD: ICD-10-PCS | Mod: CPTII,,, | Performed by: OBSTETRICS & GYNECOLOGY

## 2023-08-24 PROCEDURE — 3079F DIAST BP 80-89 MM HG: CPT | Mod: CPTII,,, | Performed by: OBSTETRICS & GYNECOLOGY

## 2023-08-24 PROCEDURE — 3044F HG A1C LEVEL LT 7.0%: CPT | Mod: CPTII,,, | Performed by: OBSTETRICS & GYNECOLOGY

## 2023-08-24 PROCEDURE — 3008F PR BODY MASS INDEX (BMI) DOCUMENTED: ICD-10-PCS | Mod: CPTII,,, | Performed by: OBSTETRICS & GYNECOLOGY

## 2023-08-24 PROCEDURE — 99395 PR PREVENTIVE VISIT,EST,18-39: ICD-10-PCS | Mod: S$PBB,,, | Performed by: OBSTETRICS & GYNECOLOGY

## 2023-08-24 PROCEDURE — 3074F PR MOST RECENT SYSTOLIC BLOOD PRESSURE < 130 MM HG: ICD-10-PCS | Mod: CPTII,,, | Performed by: OBSTETRICS & GYNECOLOGY

## 2023-08-24 PROCEDURE — 99395 PREV VISIT EST AGE 18-39: CPT | Mod: S$PBB,,, | Performed by: OBSTETRICS & GYNECOLOGY

## 2023-08-24 PROCEDURE — 3079F PR MOST RECENT DIASTOLIC BLOOD PRESSURE 80-89 MM HG: ICD-10-PCS | Mod: CPTII,,, | Performed by: OBSTETRICS & GYNECOLOGY

## 2023-08-24 PROCEDURE — 3044F PR MOST RECENT HEMOGLOBIN A1C LEVEL <7.0%: ICD-10-PCS | Mod: CPTII,,, | Performed by: OBSTETRICS & GYNECOLOGY

## 2023-08-24 PROCEDURE — 99213 OFFICE O/P EST LOW 20 MIN: CPT | Mod: PBBFAC,PO | Performed by: OBSTETRICS & GYNECOLOGY

## 2023-08-24 PROCEDURE — 1160F PR REVIEW ALL MEDS BY PRESCRIBER/CLIN PHARMACIST DOCUMENTED: ICD-10-PCS | Mod: CPTII,,, | Performed by: OBSTETRICS & GYNECOLOGY

## 2023-08-24 PROCEDURE — 3074F SYST BP LT 130 MM HG: CPT | Mod: CPTII,,, | Performed by: OBSTETRICS & GYNECOLOGY

## 2023-08-24 PROCEDURE — 3008F BODY MASS INDEX DOCD: CPT | Mod: CPTII,,, | Performed by: OBSTETRICS & GYNECOLOGY

## 2023-08-24 PROCEDURE — 1160F RVW MEDS BY RX/DR IN RCRD: CPT | Mod: CPTII,,, | Performed by: OBSTETRICS & GYNECOLOGY

## 2023-08-24 PROCEDURE — 99999 PR PBB SHADOW E&M-EST. PATIENT-LVL III: CPT | Mod: PBBFAC,,, | Performed by: OBSTETRICS & GYNECOLOGY

## 2023-08-24 PROCEDURE — 1159F MED LIST DOCD IN RCRD: CPT | Mod: CPTII,,, | Performed by: OBSTETRICS & GYNECOLOGY

## 2023-08-24 RX ORDER — EPINEPHRINE 0.3 MG/.3ML
1 INJECTION SUBCUTANEOUS
Qty: 1 EACH | Refills: 0 | Status: SHIPPED | OUTPATIENT
Start: 2023-08-24

## 2023-08-24 RX ORDER — NORETHINDRONE ACETATE AND ETHINYL ESTRADIOL 1; 5 MG/1; UG/1
1 TABLET ORAL DAILY
Qty: 84 TABLET | Refills: 4 | Status: SHIPPED | OUTPATIENT
Start: 2023-08-24 | End: 2024-08-23

## 2023-08-24 NOTE — PROGRESS NOTES
GYNECOLOGY OFFICE NOTE    Reason for visit: annual    HPI: Pt is a 33 y.o.  female  who presents for annual. Menarche: 12. Cycle: Interval- none on continuous ocp- likely with endometriosis.. Denies pelvic pain. Previously evaluated and treated with PT.  She is sexually active.  She uses oral contraceptives (estrogen/progesterone) for contraception.  She does not desire STI screening. She denies vaginal discharge.  Last pap: 10/2021, denies hx of abnormal.     Past Medical History:   Diagnosis Date    Fibromyalgia 2021    Last month    Graves disease     Hashimoto's disease     diagnosed this year in march       History reviewed. No pertinent surgical history.    Family History   Problem Relation Age of Onset    Breast cancer Paternal Grandmother     Colon cancer Neg Hx     Ovarian cancer Neg Hx        Social History     Tobacco Use    Smoking status: Never    Smokeless tobacco: Never   Substance Use Topics    Alcohol use: Never    Drug use: Never       OB History    Para Term  AB Living   0 0 0 0 0 0   SAB IAB Ectopic Multiple Live Births   0 0 0 0 0       Current Outpatient Medications   Medication Sig    atenoloL (TENORMIN) 25 MG tablet Take 1 tablet (25 mg total) by mouth once daily.    FLUoxetine 20 MG capsule TAKE 3 CAPSULES BY MOUTH ONCE DAILY    albuterol (VENTOLIN HFA) 90 mcg/actuation inhaler Inhale 2 puffs into the lungs every 6 (six) hours as needed for Wheezing. Rescue    azelastine (ASTELIN) 137 mcg (0.1 %) nasal spray 1 spray (137 mcg total) by Nasal route 2 (two) times daily. (Patient not taking: Reported on 2023)    EPINEPHrine (EPIPEN) 0.3 mg/0.3 mL AtIn Inject 0.3 mLs (0.3 mg total) into the muscle as needed (allergic reaction).    FLUoxetine 20 MG capsule Take 3 capsules (60 mg total) by mouth once daily. (Patient not taking: Reported on 2023)    ipratropium (ATROVENT) 21 mcg (0.03 %) nasal spray 2 sprays by Nasal route 2 (two) times daily. (Patient not  "taking: Reported on 8/7/2023)    norethindrone-ethinyl estradiol (FEMHRT 1/5) 1-5 mg-mcg Tab Take 1 tablet by mouth once daily.     No current facility-administered medications for this visit.       Allergies: Nuts [tree nut], Cetirizine, Hydroxyzine, Levocetirizine, and Loratadine     /80   Ht 5' 6" (1.676 m)   Wt 58.1 kg (128 lb 1.4 oz)   LMP  (LMP Unknown)   BMI 20.67 kg/m²     ROS:  GENERAL: Denies fever or chills.   SKIN: Denies rash or lesions.   HEAD: Denies head injury or headache.   CHEST: Denies chest pain or shortness of breath.   CARDIOVASCULAR: Denies palpitations or chest pain.   ABDOMEN: No constipation, diarrhea, nausea, vomiting or rectal bleeding.   URINARY: No dysuria, hematuria, or burning on urination.  REPRODUCTIVE: See HPI.   BREASTS: see HPI  NEUROLOGIC: Denies syncope or weakness.     Physical Exam:  GENERAL: alert, appears stated age and cooperative  NEUROLOGIC: orientated to person, place and time, normal mood and affect   CHEST: Normal respiratory effort  NECK: normal appearance  SKIN: no acne, hirsutism  BREAST EXAM: breasts appear normal, no suspicious masses, no skin or nipple changes or axillary nodes  ABDOMEN: abdomen is soft without significant tenderness, masses  EXTERNAL GENITALIA:  normal general appearance  URETHRA: normal urethra, normal urethral meatus  VAGINA:  normal mucosa, no  lesions  CERVIX:  Normal  UTERUS:  mobile, non tender  ADNEXA: nontender    Diagnosis:  1. Well woman exam with routine gynecological exam    2. General counseling and advice on female contraception    3. Screening for cervical cancer        Plan:   1. Annual  2. Refill on meds to stop cycles to assist with pain related to endometriosis  3. Pap today    Orders Placed This Encounter    Liquid-Based Pap Smear, Screening    EPINEPHrine (EPIPEN) 0.3 mg/0.3 mL AtIn    norethindrone-ethinyl estradiol (FEMHRT 1/5) 1-5 mg-mcg Tab               Liz Eddy MD  OB/GYN            "

## 2023-08-29 LAB
FINAL PATHOLOGIC DIAGNOSIS: NORMAL
Lab: NORMAL

## 2023-09-07 ENCOUNTER — PATIENT MESSAGE (OUTPATIENT)
Dept: SURGERY | Facility: HOSPITAL | Age: 33
End: 2023-09-07
Payer: MEDICAID

## 2023-10-06 ENCOUNTER — PATIENT OUTREACH (OUTPATIENT)
Dept: ADMINISTRATIVE | Facility: OTHER | Age: 33
End: 2023-10-06
Payer: MEDICAID

## 2023-10-06 ENCOUNTER — OFFICE VISIT (OUTPATIENT)
Dept: PRIMARY CARE CLINIC | Facility: CLINIC | Age: 33
End: 2023-10-06
Payer: MEDICAID

## 2023-10-06 VITALS
TEMPERATURE: 98 F | DIASTOLIC BLOOD PRESSURE: 74 MMHG | HEART RATE: 89 BPM | BODY MASS INDEX: 20.15 KG/M2 | HEIGHT: 66 IN | SYSTOLIC BLOOD PRESSURE: 122 MMHG | OXYGEN SATURATION: 97 % | WEIGHT: 125.38 LBS

## 2023-10-06 DIAGNOSIS — Z23 NEEDS FLU SHOT: ICD-10-CM

## 2023-10-06 DIAGNOSIS — E06.3 HASHIMOTO'S DISEASE: Primary | ICD-10-CM

## 2023-10-06 DIAGNOSIS — R00.0 TACHYCARDIA: ICD-10-CM

## 2023-10-06 PROCEDURE — 90686 IIV4 VACC NO PRSV 0.5 ML IM: CPT | Mod: PBBFAC,PN

## 2023-10-06 PROCEDURE — 99214 PR OFFICE/OUTPT VISIT, EST, LEVL IV, 30-39 MIN: ICD-10-PCS | Mod: S$PBB,,, | Performed by: NURSE PRACTITIONER

## 2023-10-06 PROCEDURE — 99999PBSHW FLU VACCINE (QUAD) GREATER THAN OR EQUAL TO 3YO PRESERVATIVE FREE IM: ICD-10-PCS | Mod: PBBFAC,,,

## 2023-10-06 PROCEDURE — 99214 OFFICE O/P EST MOD 30 MIN: CPT | Mod: S$PBB,,, | Performed by: NURSE PRACTITIONER

## 2023-10-06 PROCEDURE — 3044F PR MOST RECENT HEMOGLOBIN A1C LEVEL <7.0%: ICD-10-PCS | Mod: CPTII,,, | Performed by: NURSE PRACTITIONER

## 2023-10-06 PROCEDURE — 3008F BODY MASS INDEX DOCD: CPT | Mod: CPTII,,, | Performed by: NURSE PRACTITIONER

## 2023-10-06 PROCEDURE — 99214 OFFICE O/P EST MOD 30 MIN: CPT | Mod: PBBFAC,PN | Performed by: NURSE PRACTITIONER

## 2023-10-06 PROCEDURE — 1159F PR MEDICATION LIST DOCUMENTED IN MEDICAL RECORD: ICD-10-PCS | Mod: CPTII,,, | Performed by: NURSE PRACTITIONER

## 2023-10-06 PROCEDURE — 99999 PR PBB SHADOW E&M-EST. PATIENT-LVL IV: CPT | Mod: PBBFAC,,, | Performed by: NURSE PRACTITIONER

## 2023-10-06 PROCEDURE — 3044F HG A1C LEVEL LT 7.0%: CPT | Mod: CPTII,,, | Performed by: NURSE PRACTITIONER

## 2023-10-06 PROCEDURE — 1159F MED LIST DOCD IN RCRD: CPT | Mod: CPTII,,, | Performed by: NURSE PRACTITIONER

## 2023-10-06 PROCEDURE — 3078F DIAST BP <80 MM HG: CPT | Mod: CPTII,,, | Performed by: NURSE PRACTITIONER

## 2023-10-06 PROCEDURE — 3074F SYST BP LT 130 MM HG: CPT | Mod: CPTII,,, | Performed by: NURSE PRACTITIONER

## 2023-10-06 PROCEDURE — 99999PBSHW FLU VACCINE (QUAD) GREATER THAN OR EQUAL TO 3YO PRESERVATIVE FREE IM: Mod: PBBFAC,,,

## 2023-10-06 PROCEDURE — 3078F PR MOST RECENT DIASTOLIC BLOOD PRESSURE < 80 MM HG: ICD-10-PCS | Mod: CPTII,,, | Performed by: NURSE PRACTITIONER

## 2023-10-06 PROCEDURE — 99999 PR PBB SHADOW E&M-EST. PATIENT-LVL IV: ICD-10-PCS | Mod: PBBFAC,,, | Performed by: NURSE PRACTITIONER

## 2023-10-06 PROCEDURE — 3008F PR BODY MASS INDEX (BMI) DOCUMENTED: ICD-10-PCS | Mod: CPTII,,, | Performed by: NURSE PRACTITIONER

## 2023-10-06 PROCEDURE — 3074F PR MOST RECENT SYSTOLIC BLOOD PRESSURE < 130 MM HG: ICD-10-PCS | Mod: CPTII,,, | Performed by: NURSE PRACTITIONER

## 2023-10-06 PROCEDURE — 1160F RVW MEDS BY RX/DR IN RCRD: CPT | Mod: CPTII,,, | Performed by: NURSE PRACTITIONER

## 2023-10-06 PROCEDURE — 1160F PR REVIEW ALL MEDS BY PRESCRIBER/CLIN PHARMACIST DOCUMENTED: ICD-10-PCS | Mod: CPTII,,, | Performed by: NURSE PRACTITIONER

## 2023-10-06 RX ORDER — ATENOLOL 25 MG/1
25 TABLET ORAL 2 TIMES DAILY
Qty: 60 TABLET | Refills: 3 | Status: SHIPPED | OUTPATIENT
Start: 2023-10-06 | End: 2024-02-12 | Stop reason: SDUPTHER

## 2023-10-06 NOTE — PROGRESS NOTES
CHW - Initial Contact    This Community Health Worker completed the Social Determinant of Health questionnaire with patient during clinic visit today.    Pt identified barriers of most importance are none at this time.   Referrals to community agencies completed with patient consent outside of [facility] include: No outside referrals at this time.  Referrals were put through [facility] - no  Support and Services: None  Other information discussed the patient needs help with: No other information was discussed.   Follow up required: No  No future outreach task assigned

## 2023-10-16 NOTE — PROGRESS NOTES
Subjective:       Patient ID: Aury Dupree is a 33 y.o. female.    Chief Complaint: Tachycardia     History of Present Illness:   Aury Dupree 33 y.o. female presents today for follow up tachycardia. Patient was seen in ER and treated for tachycardia. She has history of Graves/Hashimoto's disease. Heart rate in clinic today normal. Treatment options and alternatives were discussed with the patient. Patient provided opportunity to ask additional questions.  All questions were answered. Voices understanding and acceptance of this advice. Instructed to call back if any further questions or concerns.      Past Medical History:   Diagnosis Date    Fibromyalgia 09/2021    Last month    Graves disease     Hashimoto's disease     diagnosed this year in march     Family History   Problem Relation Age of Onset    Breast cancer Paternal Grandmother     Colon cancer Neg Hx     Ovarian cancer Neg Hx      Social History     Socioeconomic History    Marital status:    Tobacco Use    Smoking status: Never    Smokeless tobacco: Never   Substance and Sexual Activity    Alcohol use: Never    Drug use: Never    Sexual activity: Yes     Partners: Male     Birth control/protection: OCP, Partner-Vasectomy     Social Determinants of Health     Financial Resource Strain: Low Risk  (10/6/2023)    Overall Financial Resource Strain (CARDIA)     Difficulty of Paying Living Expenses: Not hard at all   Food Insecurity: No Food Insecurity (10/6/2023)    Hunger Vital Sign     Worried About Running Out of Food in the Last Year: Never true     Ran Out of Food in the Last Year: Never true   Transportation Needs: No Transportation Needs (10/6/2023)    PRAPARE - Transportation     Lack of Transportation (Medical): No     Lack of Transportation (Non-Medical): No   Physical Activity: Sufficiently Active (10/6/2023)    Exercise Vital Sign     Days of Exercise per Week: 5 days     Minutes of Exercise per Session: 30 min   Stress: No Stress  Concern Present (10/6/2023)    [place] [employer] - Occupational Stress Questionnaire     Feeling of Stress : Not at all   Social Connections: Moderately Isolated (10/6/2023)    Social Connection and Isolation Panel [NHANES]     Frequency of Communication with Friends and Family: More than three times a week     Frequency of Social Gatherings with Friends and Family: More than three times a week     Attends [employer] Services: Never     Active Member of Clubs or Organizations: No     Attends Club or Organization Meetings: Never     Marital Status:    Housing Stability: Low Risk  (10/6/2023)    Housing Stability Vital Sign     Unable to Pay for Housing in the Last Year: No     Number of Places Lived in the Last Year: 1     Unstable Housing in the Last Year: No     Outpatient Encounter Medications as of 10/6/2023   Medication Sig Dispense Refill    azelastine (ASTELIN) 137 mcg (0.1 %) nasal spray 1 spray (137 mcg total) by Nasal route 2 (two) times daily. 30 mL 3    EPINEPHrine (EPIPEN) 0.3 mg/0.3 mL AtIn Inject 0.3 mLs (0.3 mg total) into the muscle as needed (allergic reaction). 1 each 0    FLUoxetine 20 MG capsule TAKE 3 CAPSULES BY MOUTH ONCE DAILY 90 capsule 1    norethindrone-ethinyl estradiol (FEMHRT 1/5) 1-5 mg-mcg Tab Take 1 tablet by mouth once daily. 84 tablet 4    [DISCONTINUED] albuterol (VENTOLIN HFA) 90 mcg/actuation inhaler Inhale 2 puffs into the lungs every 6 (six) hours as needed for Wheezing. Rescue 18 g 12    [DISCONTINUED] atenoloL (TENORMIN) 25 MG tablet Take 1 tablet (25 mg total) by mouth once daily. 30 tablet 2    atenoloL (TENORMIN) 25 MG tablet Take 1 tablet (25 mg total) by mouth 2 (two) times daily. 60 tablet 3    [DISCONTINUED] FLUoxetine 20 MG capsule Take 3 capsules (60 mg total) by mouth once daily. (Patient not taking: Reported on 8/24/2023) 90 capsule 3    [DISCONTINUED] ipratropium (ATROVENT) 21 mcg (0.03 %) nasal spray 2 sprays by Nasal route 2 (two)  "times daily. (Patient not taking: Reported on 8/7/2023) 30 mL 1     No facility-administered encounter medications on file as of 10/6/2023.       Review of Systems   Constitutional:  Negative for activity change and unexpected weight change.   HENT:  Negative for hearing loss, rhinorrhea and trouble swallowing.    Eyes:  Negative for discharge and visual disturbance.   Respiratory:  Negative for chest tightness and wheezing.    Cardiovascular:  Positive for palpitations. Negative for chest pain.   Gastrointestinal:  Negative for blood in stool, constipation, diarrhea and vomiting.   Endocrine: Negative for polydipsia and polyuria.   Genitourinary:  Negative for difficulty urinating, dysuria, hematuria and menstrual problem.   Musculoskeletal:  Negative for arthralgias, joint swelling and neck pain.   Neurological:  Negative for weakness and headaches.   Psychiatric/Behavioral:  Negative for confusion and dysphoric mood.        Objective:      /74 (BP Location: Left arm, Patient Position: Sitting, BP Method: Medium (Manual))   Pulse 89   Temp 97.7 °F (36.5 °C) (Temporal)   Ht 5' 6" (1.676 m)   Wt 56.9 kg (125 lb 6.4 oz)   SpO2 97%   BMI 20.24 kg/m²   Physical Exam  Vitals and nursing note reviewed.   Constitutional:       Appearance: She is well-developed.   HENT:      Head: Normocephalic and atraumatic.      Right Ear: External ear normal.      Left Ear: External ear normal.      Nose: Nose normal.   Eyes:      Conjunctiva/sclera: Conjunctivae normal.      Pupils: Pupils are equal, round, and reactive to light.   Cardiovascular:      Rate and Rhythm: Normal rate and regular rhythm.      Heart sounds: Normal heart sounds. No murmur heard.  Pulmonary:      Effort: Pulmonary effort is normal.      Breath sounds: Normal breath sounds. No wheezing.   Abdominal:      General: Bowel sounds are normal.      Palpations: Abdomen is soft.      Tenderness: There is no abdominal tenderness.   Musculoskeletal:         " General: Normal range of motion.      Cervical back: Normal range of motion and neck supple.   Skin:     General: Skin is warm and dry.      Findings: No rash.   Neurological:      Mental Status: She is alert and oriented to person, place, and time.      Deep Tendon Reflexes: Reflexes are normal and symmetric.   Psychiatric:         Behavior: Behavior normal.         Thought Content: Thought content normal.         Judgment: Judgment normal.         Results for orders placed or performed in visit on 08/24/23   Liquid-Based Pap Smear, Screening   Result Value Ref Range    Final Pathologic Diagnosis       Specimen Adequacy  Satisfactory for interpretation. Endocervical component is present.    [place] Category  Negative for intraepithelial lesion or malignancy.      Disclaimer       The Pap smear is a screening test that aids in the detection of cervical cancer and cancer precursors. Both false positive and false negative results can occur. The test should be used at regular intervals, and positive results should be confirmed before   definitive therapy.  This liquid based specimen is processed using the  or  Thin PrepPAP System. This specimen has been analyzed by the ThinPrep Imaging System (Optimal+), an automated imaging and review system which assists the laboratory in evaluating   cells on ThinPrep PAP tests. Following automated imaging, selected fields from every slide are reviewed by a cytotechnologist and/or pathologist.     Screening was performed at [facility], [address], [place] [address].       Assessment:       1. Hashimoto's disease    2. Tachycardia    3. Needs flu shot        Plan:   Hashimoto's disease   - Continue the atenolol (TENORMIN) 25 MG tablet; Take 1 tablet (25 mg total) by mouth 2 (two) times daily.  Dispense: 60 tablet; Refill: 3  -Labs (Thyroid Panel in 3 months)  -Follow up 3 months.   Tachycardia    Continue  the atenolol (TENORMIN) 25 MG tablet; Take 1 tablet (25 mg total) by mouth 2 (two) times daily.  Dispense: 60 tablet; Refill: 3    Needs flu shot  -     Influenza - Quadrivalent *Preferred* (6 months+) (PF)      -                 [facility]   [address] [place] [address] [address]  [place] [address]  Office [phone]  Fax [phone]

## 2023-12-10 NOTE — PROGRESS NOTES
"[facility] [address]     SUBJECTIVE:     History of Present Illness:  Patient is a 33 y.o. female presents with fast heart rate in the past and was started on atenolol. Started with graves disease few years ago. On atenolol, highest heart rate is 100. Once thyroid was back to normal; taking one atenolol a day, would feel good and after 12 hours, would feel bad; heart rate would go up and she will be panting. This has resolved with BID dosage      Primary Diagnosis:   Hypertension: none  DM: none  Smoker: none  Family history of early CAD: none  Heart disease: possible heart murmur    ROS  No on atenolol feels great  No chest pains  No shortness of breath; no PND or orthopnea  Thyroid status normal now  Activity: normal  No syncope  Review of patient's allergies indicates:   Allergen Reactions    Nuts [tree nut]     Cetirizine Hives    Hydroxyzine Hives    Levocetirizine Hives    Loratadine Hives       Past Medical History:   Diagnosis Date    Fibromyalgia 2021    Last month    Graves disease     Hashimoto's disease     diagnosed this year in march       History reviewed. No pertinent surgical history.        Past Hospitalization:         Cardiac meds:  Atenolol 25 mg BID        OBJECTIVE:     Vital Signs (Most Recent)  Vitals:    23 0927   BP: 115/77   Pulse: 84   SpO2: 99%   Weight: 57.8 kg (127 lb 6.8 oz)   Height: 5' 6" (1.676 m)         Physical Exam:  Neck: normal carotids, no bruits; normal JVP  Lungs :clear  Heart: RR, normal S1,S2, no murmurs, no gallops  Abd: no masses; no bruits;   Exts: normal DP and PT pulses bilaterally, normal radials; no edema noted               Labs  : all normal     Diagnostic Results:    1.EK.Echo:   3. Stress test  4. cath    Chart review:    Echo: : normal    EKGs: sinus tachycardia; nonspecific ST    ASSESSMENT/PLAN:     Sinus tachycardia: much improved on atenolol    Plan: may change atenolol to once a day   No need to investigate further     Pramilla N. " MD Amadou

## 2023-12-11 ENCOUNTER — OFFICE VISIT (OUTPATIENT)
Dept: CARDIOLOGY | Facility: CLINIC | Age: 33
End: 2023-12-11
Payer: MEDICAID

## 2023-12-11 VITALS
DIASTOLIC BLOOD PRESSURE: 77 MMHG | BODY MASS INDEX: 20.48 KG/M2 | OXYGEN SATURATION: 99 % | WEIGHT: 127.44 LBS | HEIGHT: 66 IN | HEART RATE: 84 BPM | SYSTOLIC BLOOD PRESSURE: 115 MMHG

## 2023-12-11 DIAGNOSIS — R00.0 TACHYCARDIA: ICD-10-CM

## 2023-12-11 DIAGNOSIS — R00.2 PALPITATIONS: Primary | ICD-10-CM

## 2023-12-11 PROCEDURE — 3044F PR MOST RECENT HEMOGLOBIN A1C LEVEL <7.0%: ICD-10-PCS | Mod: CPTII,,, | Performed by: INTERNAL MEDICINE

## 2023-12-11 PROCEDURE — 3044F HG A1C LEVEL LT 7.0%: CPT | Mod: CPTII,,, | Performed by: INTERNAL MEDICINE

## 2023-12-11 PROCEDURE — 1159F PR MEDICATION LIST DOCUMENTED IN MEDICAL RECORD: ICD-10-PCS | Mod: CPTII,,, | Performed by: INTERNAL MEDICINE

## 2023-12-11 PROCEDURE — 99204 OFFICE O/P NEW MOD 45 MIN: CPT | Mod: S$PBB,,, | Performed by: INTERNAL MEDICINE

## 2023-12-11 PROCEDURE — 3078F PR MOST RECENT DIASTOLIC BLOOD PRESSURE < 80 MM HG: ICD-10-PCS | Mod: CPTII,,, | Performed by: INTERNAL MEDICINE

## 2023-12-11 PROCEDURE — 1160F RVW MEDS BY RX/DR IN RCRD: CPT | Mod: CPTII,,, | Performed by: INTERNAL MEDICINE

## 2023-12-11 PROCEDURE — 3078F DIAST BP <80 MM HG: CPT | Mod: CPTII,,, | Performed by: INTERNAL MEDICINE

## 2023-12-11 PROCEDURE — 1160F PR REVIEW ALL MEDS BY PRESCRIBER/CLIN PHARMACIST DOCUMENTED: ICD-10-PCS | Mod: CPTII,,, | Performed by: INTERNAL MEDICINE

## 2023-12-11 PROCEDURE — 99204 PR OFFICE/OUTPT VISIT, NEW, LEVL IV, 45-59 MIN: ICD-10-PCS | Mod: S$PBB,,, | Performed by: INTERNAL MEDICINE

## 2023-12-11 PROCEDURE — 3008F BODY MASS INDEX DOCD: CPT | Mod: CPTII,,, | Performed by: INTERNAL MEDICINE

## 2023-12-11 PROCEDURE — 3008F PR BODY MASS INDEX (BMI) DOCUMENTED: ICD-10-PCS | Mod: CPTII,,, | Performed by: INTERNAL MEDICINE

## 2023-12-11 PROCEDURE — 99999 PR PBB SHADOW E&M-EST. PATIENT-LVL III: CPT | Mod: PBBFAC,,, | Performed by: INTERNAL MEDICINE

## 2023-12-11 PROCEDURE — 3074F PR MOST RECENT SYSTOLIC BLOOD PRESSURE < 130 MM HG: ICD-10-PCS | Mod: CPTII,,, | Performed by: INTERNAL MEDICINE

## 2023-12-11 PROCEDURE — 99999 PR PBB SHADOW E&M-EST. PATIENT-LVL III: ICD-10-PCS | Mod: PBBFAC,,, | Performed by: INTERNAL MEDICINE

## 2023-12-11 PROCEDURE — 99213 OFFICE O/P EST LOW 20 MIN: CPT | Mod: PBBFAC,PN | Performed by: INTERNAL MEDICINE

## 2023-12-11 PROCEDURE — 1159F MED LIST DOCD IN RCRD: CPT | Mod: CPTII,,, | Performed by: INTERNAL MEDICINE

## 2023-12-11 PROCEDURE — 3074F SYST BP LT 130 MM HG: CPT | Mod: CPTII,,, | Performed by: INTERNAL MEDICINE

## 2024-02-15 RX ORDER — ATENOLOL 25 MG/1
25 TABLET ORAL 2 TIMES DAILY
Qty: 60 TABLET | Refills: 3 | Status: SHIPPED | OUTPATIENT
Start: 2024-02-15 | End: 2024-03-16

## 2024-03-18 ENCOUNTER — PATIENT MESSAGE (OUTPATIENT)
Dept: PRIMARY CARE CLINIC | Facility: CLINIC | Age: 34
End: 2024-03-18
Payer: MEDICAID

## 2024-05-15 ENCOUNTER — OFFICE VISIT (OUTPATIENT)
Dept: PRIMARY CARE CLINIC | Facility: CLINIC | Age: 34
End: 2024-05-15
Payer: MEDICAID

## 2024-05-15 ENCOUNTER — LAB VISIT (OUTPATIENT)
Dept: LAB | Facility: HOSPITAL | Age: 34
End: 2024-05-15
Attending: NURSE PRACTITIONER
Payer: MEDICAID

## 2024-05-15 VITALS
OXYGEN SATURATION: 98 % | DIASTOLIC BLOOD PRESSURE: 68 MMHG | HEART RATE: 93 BPM | BODY MASS INDEX: 20.67 KG/M2 | TEMPERATURE: 97 F | HEIGHT: 66 IN | WEIGHT: 128.63 LBS | SYSTOLIC BLOOD PRESSURE: 110 MMHG

## 2024-05-15 DIAGNOSIS — Z13.6 ENCOUNTER FOR LIPID SCREENING FOR CARDIOVASCULAR DISEASE: ICD-10-CM

## 2024-05-15 DIAGNOSIS — Z13.220 ENCOUNTER FOR LIPID SCREENING FOR CARDIOVASCULAR DISEASE: ICD-10-CM

## 2024-05-15 DIAGNOSIS — Z13.1 SCREENING FOR DIABETES MELLITUS: ICD-10-CM

## 2024-05-15 DIAGNOSIS — Z00.00 GENERAL MEDICAL EXAM: ICD-10-CM

## 2024-05-15 DIAGNOSIS — E06.3 HASHIMOTO'S DISEASE: Primary | ICD-10-CM

## 2024-05-15 DIAGNOSIS — R22.1 NECK MASS: ICD-10-CM

## 2024-05-15 LAB
ALBUMIN SERPL BCP-MCNC: 3.9 G/DL (ref 3.5–5.2)
ALP SERPL-CCNC: 64 U/L (ref 55–135)
ALT SERPL W/O P-5'-P-CCNC: 13 U/L (ref 10–44)
ANION GAP SERPL CALC-SCNC: 10 MMOL/L (ref 8–16)
AST SERPL-CCNC: 24 U/L (ref 10–40)
BASOPHILS # BLD AUTO: 0.04 K/UL (ref 0–0.2)
BASOPHILS NFR BLD: 0.6 % (ref 0–1.9)
BILIRUB SERPL-MCNC: 0.3 MG/DL (ref 0.1–1)
BUN SERPL-MCNC: 13 MG/DL (ref 6–20)
CALCIUM SERPL-MCNC: 10 MG/DL (ref 8.7–10.5)
CHLORIDE SERPL-SCNC: 106 MMOL/L (ref 95–110)
CHOLEST SERPL-MCNC: 169 MG/DL (ref 120–199)
CHOLEST/HDLC SERPL: 4.8 {RATIO} (ref 2–5)
CO2 SERPL-SCNC: 21 MMOL/L (ref 23–29)
CREAT SERPL-MCNC: 0.8 MG/DL (ref 0.5–1.4)
DIFFERENTIAL METHOD BLD: ABNORMAL
EOSINOPHIL # BLD AUTO: 0.5 K/UL (ref 0–0.5)
EOSINOPHIL NFR BLD: 7.5 % (ref 0–8)
ERYTHROCYTE [DISTWIDTH] IN BLOOD BY AUTOMATED COUNT: 12.1 % (ref 11.5–14.5)
EST. GFR  (NO RACE VARIABLE): >60 ML/MIN/1.73 M^2
ESTIMATED AVG GLUCOSE: 91 MG/DL (ref 68–131)
GLUCOSE SERPL-MCNC: 74 MG/DL (ref 70–110)
HBA1C MFR BLD: 4.8 % (ref 4–5.6)
HCT VFR BLD AUTO: 42.9 % (ref 37–48.5)
HDLC SERPL-MCNC: 35 MG/DL (ref 40–75)
HDLC SERPL: 20.7 % (ref 20–50)
HGB BLD-MCNC: 13.9 G/DL (ref 12–16)
IMM GRANULOCYTES # BLD AUTO: 0.02 K/UL (ref 0–0.04)
IMM GRANULOCYTES NFR BLD AUTO: 0.3 % (ref 0–0.5)
LDLC SERPL CALC-MCNC: 109.6 MG/DL (ref 63–159)
LYMPHOCYTES # BLD AUTO: 1.6 K/UL (ref 1–4.8)
LYMPHOCYTES NFR BLD: 22.5 % (ref 18–48)
MCH RBC QN AUTO: 31.9 PG (ref 27–31)
MCHC RBC AUTO-ENTMCNC: 32.4 G/DL (ref 32–36)
MCV RBC AUTO: 98 FL (ref 82–98)
MONOCYTES # BLD AUTO: 0.5 K/UL (ref 0.3–1)
MONOCYTES NFR BLD: 6.5 % (ref 4–15)
NEUTROPHILS # BLD AUTO: 4.3 K/UL (ref 1.8–7.7)
NEUTROPHILS NFR BLD: 62.6 % (ref 38–73)
NONHDLC SERPL-MCNC: 134 MG/DL
NRBC BLD-RTO: 0 /100 WBC
PLATELET # BLD AUTO: 268 K/UL (ref 150–450)
PMV BLD AUTO: 10.1 FL (ref 9.2–12.9)
POTASSIUM SERPL-SCNC: 4.4 MMOL/L (ref 3.5–5.1)
PROT SERPL-MCNC: 7.4 G/DL (ref 6–8.4)
RBC # BLD AUTO: 4.36 M/UL (ref 4–5.4)
SODIUM SERPL-SCNC: 137 MMOL/L (ref 136–145)
T3FREE SERPL-MCNC: 2.7 PG/ML (ref 2.3–4.2)
T4 FREE SERPL-MCNC: 0.95 NG/DL (ref 0.71–1.51)
TRIGL SERPL-MCNC: 122 MG/DL (ref 30–150)
TSH SERPL DL<=0.005 MIU/L-ACNC: 1.04 UIU/ML (ref 0.4–4)
WBC # BLD AUTO: 6.9 K/UL (ref 3.9–12.7)

## 2024-05-15 PROCEDURE — 84439 ASSAY OF FREE THYROXINE: CPT | Performed by: NURSE PRACTITIONER

## 2024-05-15 PROCEDURE — 80061 LIPID PANEL: CPT | Performed by: NURSE PRACTITIONER

## 2024-05-15 PROCEDURE — 83036 HEMOGLOBIN GLYCOSYLATED A1C: CPT | Performed by: NURSE PRACTITIONER

## 2024-05-15 PROCEDURE — 3008F BODY MASS INDEX DOCD: CPT | Mod: CPTII,,, | Performed by: NURSE PRACTITIONER

## 2024-05-15 PROCEDURE — 99999 PR PBB SHADOW E&M-EST. PATIENT-LVL IV: CPT | Mod: PBBFAC,,, | Performed by: NURSE PRACTITIONER

## 2024-05-15 PROCEDURE — 1160F RVW MEDS BY RX/DR IN RCRD: CPT | Mod: CPTII,,, | Performed by: NURSE PRACTITIONER

## 2024-05-15 PROCEDURE — 84481 FREE ASSAY (FT-3): CPT | Performed by: NURSE PRACTITIONER

## 2024-05-15 PROCEDURE — 3044F HG A1C LEVEL LT 7.0%: CPT | Mod: CPTII,,, | Performed by: NURSE PRACTITIONER

## 2024-05-15 PROCEDURE — 85025 COMPLETE CBC W/AUTO DIFF WBC: CPT | Performed by: NURSE PRACTITIONER

## 2024-05-15 PROCEDURE — 99214 OFFICE O/P EST MOD 30 MIN: CPT | Mod: PBBFAC,PN | Performed by: NURSE PRACTITIONER

## 2024-05-15 PROCEDURE — 36415 COLL VENOUS BLD VENIPUNCTURE: CPT | Mod: PN | Performed by: NURSE PRACTITIONER

## 2024-05-15 PROCEDURE — 80053 COMPREHEN METABOLIC PANEL: CPT | Performed by: NURSE PRACTITIONER

## 2024-05-15 PROCEDURE — 99214 OFFICE O/P EST MOD 30 MIN: CPT | Mod: S$PBB,,, | Performed by: NURSE PRACTITIONER

## 2024-05-15 PROCEDURE — 3074F SYST BP LT 130 MM HG: CPT | Mod: CPTII,,, | Performed by: NURSE PRACTITIONER

## 2024-05-15 PROCEDURE — 3078F DIAST BP <80 MM HG: CPT | Mod: CPTII,,, | Performed by: NURSE PRACTITIONER

## 2024-05-15 PROCEDURE — 1159F MED LIST DOCD IN RCRD: CPT | Mod: CPTII,,, | Performed by: NURSE PRACTITIONER

## 2024-05-15 PROCEDURE — 84443 ASSAY THYROID STIM HORMONE: CPT | Performed by: NURSE PRACTITIONER

## 2024-05-15 NOTE — PROGRESS NOTES
Subjective:       Patient ID: Aury Dupree is a 33 y.o. female.    Chief Complaint: Medication Refill, Follow-up, and Neck Pain      History of Present Illness:   Aury Dupree 33 y.o. female presents to clinic for medication management and refills for Hashimoto's disease  and to address care gaps. Patient reports that they are adhering to medication regimen as prescribed.I had the opportunity to review patient's medical records including care everywhere, labs, and medication reconciliation to determine medical decision making.  Treatment options and alternatives were discussed with the patient. Patient provided opportunity to ask additional questions.  All questions were answered. Voices understanding and acceptance of this advice. Instructed to call back if any further questions or concerns.     Past Medical History:   Diagnosis Date    Fibromyalgia 09/2021    Last month    Graves disease     Hashimoto's disease     diagnosed this year in march     Family History   Problem Relation Name Age of Onset    Breast cancer Paternal Grandmother      Colon cancer Neg Hx      Ovarian cancer Neg Hx       Social History     Socioeconomic History    Marital status:    Tobacco Use    Smoking status: Never    Smokeless tobacco: Never   Substance and Sexual Activity    Alcohol use: Never    Drug use: Never    Sexual activity: Yes     Partners: Male     Birth control/protection: OCP, Partner-Vasectomy     Social Determinants of Health     Financial Resource Strain: Low Risk  (5/13/2024)    Overall Financial Resource Strain (CARDIA)     Difficulty of Paying Living Expenses: Not very hard   Food Insecurity: No Food Insecurity (5/13/2024)    Hunger Vital Sign     Worried About Running Out of Food in the Last Year: Never true     Ran Out of Food in the Last Year: Never true   Transportation Needs: No Transportation Needs (5/13/2024)    PRAPARE - Transportation     Lack of Transportation (Medical): No     Lack of  Transportation (Non-Medical): No   Physical Activity: Sufficiently Active (5/13/2024)    Exercise Vital Sign     Days of Exercise per Week: 3 days     Minutes of Exercise per Session: 50 min   Stress: No Stress Concern Present (5/13/2024)    [employer] [place] [employer] - Occupational Stress Questionnaire     Feeling of Stress : Only a little   Housing Stability: Low Risk  (10/6/2023)    Housing Stability Vital Sign     Unable to Pay for Housing in the Last Year: No     Number of Places Lived in the Last Year: 1     Unstable Housing in the Last Year: No     Outpatient Encounter Medications as of 5/15/2024   Medication Sig Dispense Refill    atenoloL (TENORMIN) 25 MG tablet Take 1 tablet (25 mg total) by mouth 2 (two) times daily. 60 tablet 1    azelastine (ASTELIN) 137 mcg (0.1 %) nasal spray 1 spray (137 mcg total) by Nasal route 2 (two) times daily. 30 mL 3    EPINEPHrine (EPIPEN) 0.3 mg/0.3 mL AtIn Inject 0.3 mLs (0.3 mg total) into the muscle as needed (allergic reaction). 1 each 0    FLUoxetine 20 MG capsule Take 3 capsules (60 mg total) by mouth once daily. 90 capsule 1    norethindrone-ethinyl estradiol (FEMHRT 1/5) 1-5 mg-mcg Tab Take 1 tablet by mouth once daily. 84 tablet 4     No facility-administered encounter medications on file as of 5/15/2024.       Review of Systems   Constitutional:  Positive for activity change. Negative for unexpected weight change.   HENT:  Negative for hearing loss, rhinorrhea and trouble swallowing.    Eyes:  Negative for discharge and visual disturbance.   Respiratory:  Negative for chest tightness and wheezing.    Cardiovascular:  Negative for chest pain and palpitations.   Gastrointestinal:  Negative for blood in stool, constipation, diarrhea and vomiting.   Endocrine: Negative for polydipsia and polyuria.   Genitourinary:  Negative for difficulty urinating, dysuria, hematuria and menstrual problem.   Musculoskeletal:  Positive for neck pain (and right lateral  "neck mass). Negative for arthralgias and joint swelling.   Neurological:  Negative for weakness and headaches.   Psychiatric/Behavioral:  Negative for confusion and dysphoric mood.        Objective:      /68 (BP Location: Left arm, Patient Position: Sitting, BP Method: Medium (Manual))   Pulse 93   Temp 97 °F (36.1 °C) (Temporal)   Ht 5' 6" (1.676 m)   Wt 58.3 kg (128 lb 9.6 oz)   SpO2 98%   BMI 20.76 kg/m²   Physical Exam  Constitutional:       Appearance: She is well-developed.   HENT:      Head: Normocephalic.      Nose: Nose normal.   Eyes:      Pupils: Pupils are equal, round, and reactive to light.   Cardiovascular:      Rate and Rhythm: Normal rate.      Heart sounds: Normal heart sounds.   Pulmonary:      Effort: Pulmonary effort is normal.      Breath sounds: Normal breath sounds.   Abdominal:      General: Bowel sounds are normal.      Palpations: Abdomen is soft.   Musculoskeletal:         General: Normal range of motion.      Cervical back: Normal range of motion.   Skin:     General: Skin is warm and dry.   Neurological:      Mental Status: She is alert and oriented to person, place, and time.   Psychiatric:         Behavior: Behavior normal.           Assessment:       1. Hashimoto's disease    2. Encounter for lipid screening for cardiovascular disease    3. Screening for diabetes mellitus    4. General medical exam    5. Neck mass        Plan:   Aury was seen today for medication refill, follow-up and neck pain.    Diagnoses and all orders for this visit:    Hashimoto's disease    Encounter for lipid screening for cardiovascular disease  -     Lipid Panel; Future    Screening for diabetes mellitus  -     Hemoglobin A1C; Future  -     TSH; Future  -     T3, Free; Future  -     T4, Free; Future    General medical exam  -     CBC Auto Differential; Future  -     Comprehensive Metabolic Panel; Future    Neck mass  -     US Soft Tissue Head Neck; Future              [facility]   [address]  [place] [address]  Office [phone]  Fax [phone]

## 2024-05-24 ENCOUNTER — HOSPITAL ENCOUNTER (OUTPATIENT)
Dept: RADIOLOGY | Facility: HOSPITAL | Age: 34
Discharge: HOME | End: 2024-05-24
Attending: NURSE PRACTITIONER
Payer: MEDICAID

## 2024-05-24 DIAGNOSIS — R22.1 NECK MASS: ICD-10-CM

## 2024-05-24 PROCEDURE — 76536 US EXAM OF HEAD AND NECK: CPT | Mod: 26,,, | Performed by: RADIOLOGY

## 2024-05-24 PROCEDURE — 76536 US EXAM OF HEAD AND NECK: CPT | Mod: TC

## 2024-06-08 DIAGNOSIS — F41.1 GAD (GENERALIZED ANXIETY DISORDER): ICD-10-CM

## 2024-06-10 DIAGNOSIS — F41.1 GAD (GENERALIZED ANXIETY DISORDER): ICD-10-CM

## 2024-06-10 RX ORDER — FLUOXETINE HYDROCHLORIDE 20 MG/1
60 CAPSULE ORAL
Qty: 90 CAPSULE | Refills: 0 | Status: SHIPPED | OUTPATIENT
Start: 2024-06-10

## 2024-06-11 RX ORDER — ATENOLOL 25 MG/1
25 TABLET ORAL 2 TIMES DAILY
Qty: 60 TABLET | Refills: 1 | Status: SHIPPED | OUTPATIENT
Start: 2024-06-11 | End: 2024-08-10

## 2024-06-11 RX ORDER — FLUOXETINE HYDROCHLORIDE 20 MG/1
60 CAPSULE ORAL DAILY
Qty: 90 CAPSULE | Refills: 1 | Status: SHIPPED | OUTPATIENT
Start: 2024-06-11

## 2024-06-27 ENCOUNTER — OFFICE VISIT (OUTPATIENT)
Dept: PRIMARY CARE CLINIC | Facility: CLINIC | Age: 34
End: 2024-06-27
Payer: MEDICAID

## 2024-06-27 DIAGNOSIS — F41.1 GAD (GENERALIZED ANXIETY DISORDER): Primary | ICD-10-CM

## 2024-06-27 PROCEDURE — 99214 OFFICE O/P EST MOD 30 MIN: CPT | Mod: 95,,, | Performed by: NURSE PRACTITIONER

## 2024-06-27 PROCEDURE — 1160F RVW MEDS BY RX/DR IN RCRD: CPT | Mod: CPTII,95,, | Performed by: NURSE PRACTITIONER

## 2024-06-27 PROCEDURE — 3044F HG A1C LEVEL LT 7.0%: CPT | Mod: CPTII,95,, | Performed by: NURSE PRACTITIONER

## 2024-06-27 PROCEDURE — 1159F MED LIST DOCD IN RCRD: CPT | Mod: CPTII,95,, | Performed by: NURSE PRACTITIONER

## 2024-07-02 DIAGNOSIS — F41.1 GAD (GENERALIZED ANXIETY DISORDER): ICD-10-CM

## 2024-07-03 RX ORDER — FLUOXETINE HYDROCHLORIDE 20 MG/1
60 CAPSULE ORAL DAILY
Qty: 270 CAPSULE | Refills: 1 | Status: SHIPPED | OUTPATIENT
Start: 2024-07-03 | End: 2024-10-01

## 2024-08-12 NOTE — PROGRESS NOTES
Subjective:       Patient ID: Aury Dupree is a 34 y.o. female.  Chief Complaint: Medication Management and Refills for Depression/Anxiety  The patient location is:[place], [place]    Visit type: audiovisual-Synchronous      Face to Face time with patient: 11 min  20 minutes of total time spent on the encounter, which includes face to face time and non-face to face time preparing to see the patient (eg, review of tests), Obtaining and/or reviewing separately obtained history, Documenting clinical information in the electronic or other health record, Independently interpreting results (not separately reported) and communicating results to the patient/family/caregiver, or Care coordination (not separately reported).         Each patient to whom he or she provides medical services by telemedicine is:  (1) informed of the relationship between the physician and patient and the respective role of any other health care provider with respect to management of the patient; and (2) notified that he or she may decline to receive medical services by telemedicine and may withdraw from such care at any time.       History of Present Illness:   Aury Dupree 34 y.o. female Depression: Patient complains of depression. She complains of depressed mood and fatigue. Onset was approximately 3 week ago, stable since that time.  She denies current suicidal and homicidal plan or intent.   Family history significant for no psychiatric illness.Possible organic causes contributing are: none.  Risk factors: previous episode of depression Previous treatment includes Prozac and individual therapy. She complains of the following side effects from the treatment: none. Treatment options and alternatives were discussed with the patient. Patient provided opportunity to ask additional questions.  All questions were answered. Voices understanding and acceptance of this advice. Instructed to call back if any further questions or concerns.      Past  Medical History:   Diagnosis Date    Fibromyalgia 09/2021    Last month    Graves disease     Hashimoto's disease     diagnosed this year in march     Family History   Problem Relation Name Age of Onset    Breast cancer Paternal Grandmother      Colon cancer Neg Hx      Ovarian cancer Neg Hx       Social History     Socioeconomic History    Marital status:    Tobacco Use    Smoking status: Never    Smokeless tobacco: Never   Substance and Sexual Activity    Alcohol use: Never    Drug use: Never    Sexual activity: Yes     Partners: Male     Birth control/protection: OCP, Partner-Vasectomy     Social Determinants of Health     Financial Resource Strain: Low Risk  (6/25/2024)    Overall Financial Resource Strain (CARDIA)     Difficulty of Paying Living Expenses: Not very hard   Food Insecurity: No Food Insecurity (6/25/2024)    Hunger Vital Sign     Worried About Running Out of Food in the Last Year: Never true     Ran Out of Food in the Last Year: Never true   Transportation Needs: No Transportation Needs (5/13/2024)    PRAPARE - Transportation     Lack of Transportation (Medical): No     Lack of Transportation (Non-Medical): No   Physical Activity: Sufficiently Active (6/25/2024)    Exercise Vital Sign     Days of Exercise per Week: 3 days     Minutes of Exercise per Session: 60 min   Stress: No Stress Concern Present (6/25/2024)    [employer] [place] [employer] - Occupational Stress Questionnaire     Feeling of Stress : Only a little   Housing Stability: Low Risk  (10/6/2023)    Housing Stability Vital Sign     Unable to Pay for Housing in the Last Year: No     Number of Places Lived in the Last Year: 1     Unstable Housing in the Last Year: No     Outpatient Encounter Medications as of 6/27/2024   Medication Sig Dispense Refill    atenoloL (TENORMIN) 25 MG tablet Take 1 tablet (25 mg total) by mouth 2 (two) times daily. 60 tablet 1    azelastine (ASTELIN) 137 mcg (0.1 %) nasal spray 1 spray (137  mcg total) by Nasal route 2 (two) times daily. 30 mL 3    EPINEPHrine (EPIPEN) 0.3 mg/0.3 mL AtIn Inject 0.3 mLs (0.3 mg total) into the muscle as needed (allergic reaction). 1 each 0    FLUoxetine 20 MG capsule Take 3 capsules (60 mg total) by mouth once daily. 90 capsule 1    norethindrone-ethinyl estradiol (FEMHRT 1/5) 1-5 mg-mcg Tab Take 1 tablet by mouth once daily. 84 tablet 4    [DISCONTINUED] FLUoxetine 20 MG capsule TAKE 3 CAPSULES BY MOUTH ONCE DAILY 90 capsule 0     No facility-administered encounter medications on file as of 6/27/2024.       Review of Systems   Constitutional:  Negative for activity change and unexpected weight change.   HENT:  Negative for hearing loss, rhinorrhea and trouble swallowing.    Eyes:  Negative for discharge and visual disturbance.   Respiratory:  Negative for chest tightness and wheezing.    Cardiovascular:  Negative for chest pain and palpitations.   Gastrointestinal:  Negative for blood in stool, constipation, diarrhea and vomiting.   Endocrine: Negative for polydipsia and polyuria.   Genitourinary:  Negative for difficulty urinating, dysuria, hematuria and menstrual problem.   Musculoskeletal:  Negative for arthralgias, joint swelling and neck pain.   Neurological:  Negative for weakness and headaches.   Psychiatric/Behavioral:  Positive for dysphoric mood. Negative for confusion, sleep disturbance and suicidal ideas.        Objective:      There were no vitals taken for this visit.  Physical Exam  Constitutional:       Appearance: Normal appearance.   Psychiatric:         Attention and Perception: Attention normal.         Mood and Affect: Mood normal.         Thought Content: Thought content is not delusional. Thought content does not include suicidal ideation. Thought content does not include homicidal or suicidal plan.         Results for orders placed or performed in visit on 05/15/24   CBC Auto Differential   Result Value Ref Range    WBC 6.90 3.90 - 12.70 K/uL     RBC 4.36 4.00 - 5.40 M/uL    Hemoglobin 13.9 12.0 - 16.0 g/dL    Hematocrit 42.9 37.0 - 48.5 %    MCV 98 82 - 98 fL    MCH 31.9 (H) 27.0 - 31.0 pg    MCHC 32.4 32.0 - 36.0 g/dL    RDW 12.1 11.5 - 14.5 %    Platelets 268 150 - 450 K/uL    MPV 10.1 9.2 - 12.9 fL    Immature Granulocytes 0.3 0.0 - 0.5 %    Gran # (ANC) 4.3 1.8 - 7.7 K/uL    Immature Grans (Abs) 0.02 0.00 - 0.04 K/uL    Lymph # 1.6 1.0 - 4.8 K/uL    Mono # 0.5 0.3 - 1.0 K/uL    Eos # 0.5 0.0 - 0.5 K/uL    Baso # 0.04 0.00 - 0.20 K/uL    nRBC 0 0 /100 WBC    Gran % 62.6 38.0 - 73.0 %    Lymph % 22.5 18.0 - 48.0 %    Mono % 6.5 4.0 - 15.0 %    Eosinophil % 7.5 0.0 - 8.0 %    Basophil % 0.6 0.0 - 1.9 %    Differential Method Automated    Comprehensive Metabolic Panel   Result Value Ref Range    Sodium 137 136 - 145 mmol/L    Potassium 4.4 3.5 - 5.1 mmol/L    Chloride 106 95 - 110 mmol/L    CO2 21 (L) 23 - 29 mmol/L    Glucose 74 70 - 110 mg/dL    BUN 13 6 - 20 mg/dL    Creatinine 0.8 0.5 - 1.4 mg/dL    Calcium 10.0 8.7 - 10.5 mg/dL    Total Protein 7.4 6.0 - 8.4 g/dL    Albumin 3.9 3.5 - 5.2 g/dL    Total Bilirubin 0.3 0.1 - 1.0 mg/dL    Alkaline Phosphatase 64 55 - 135 U/L    AST 24 10 - 40 U/L    ALT 13 10 - 44 U/L    eGFR >60.0 >60 mL/min/1.73 m^2    Anion Gap 10 8 - 16 mmol/L   Lipid Panel   Result Value Ref Range    Cholesterol 169 120 - 199 mg/dL    Triglycerides 122 30 - 150 mg/dL    HDL 35 (L) 40 - 75 mg/dL    LDL Cholesterol 109.6 63.0 - 159.0 mg/dL    HDL/Cholesterol Ratio 20.7 20.0 - 50.0 %    Total Cholesterol/HDL Ratio 4.8 2.0 - 5.0    Non-HDL Cholesterol 134 mg/dL   Hemoglobin A1C   Result Value Ref Range    Hemoglobin A1C 4.8 4.0 - 5.6 %    Estimated Avg Glucose 91 68 - 131 mg/dL   TSH   Result Value Ref Range    TSH 1.044 0.400 - 4.000 uIU/mL   T3, Free   Result Value Ref Range    T3, Free 2.7 2.3 - 4.2 pg/mL   T4, Free   Result Value Ref Range    Free T4 0.95 0.71 - 1.51 ng/dL     Assessment:       1. TACOS (generalized anxiety disorder)         Plan:   TACOS (generalized anxiety disorder)  Comments:  Continue Prozac 60 mg as directed  Keep appointment with Behavioral Health             [facility]   [address]  [place] [address]  Office [phone]  Fax [phone]

## 2024-09-24 ENCOUNTER — PATIENT MESSAGE (OUTPATIENT)
Dept: PRIMARY CARE CLINIC | Facility: CLINIC | Age: 34
End: 2024-09-24
Payer: MEDICAID

## 2024-09-25 DIAGNOSIS — Z30.09 GENERAL COUNSELING AND ADVICE ON FEMALE CONTRACEPTION: ICD-10-CM

## 2024-09-26 RX ORDER — NORETHINDRONE ACETATE AND ETHINYL ESTRADIOL 1; 5 MG/1; UG/1
1 TABLET ORAL DAILY
Qty: 84 TABLET | Refills: 4 | Status: SHIPPED | OUTPATIENT
Start: 2024-09-26 | End: 2025-09-26

## 2024-10-03 RX ORDER — ATENOLOL 25 MG/1
25 TABLET ORAL 2 TIMES DAILY
Qty: 60 TABLET | Refills: 0 | Status: SHIPPED | OUTPATIENT
Start: 2024-10-03

## 2024-11-03 RX ORDER — ATENOLOL 25 MG/1
25 TABLET ORAL 2 TIMES DAILY
Qty: 60 TABLET | Refills: 0 | Status: SHIPPED | OUTPATIENT
Start: 2024-11-03

## 2024-11-13 ENCOUNTER — HOSPITAL ENCOUNTER (OUTPATIENT)
Dept: RADIOLOGY | Facility: HOSPITAL | Age: 34
Discharge: HOME | End: 2024-11-13
Attending: FAMILY MEDICINE
Payer: MEDICAID

## 2024-11-13 ENCOUNTER — OFFICE VISIT (OUTPATIENT)
Dept: PRIMARY CARE CLINIC | Facility: CLINIC | Age: 34
End: 2024-11-13
Payer: MEDICAID

## 2024-11-13 VITALS
TEMPERATURE: 97 F | SYSTOLIC BLOOD PRESSURE: 106 MMHG | BODY MASS INDEX: 20.57 KG/M2 | OXYGEN SATURATION: 97 % | DIASTOLIC BLOOD PRESSURE: 86 MMHG | HEIGHT: 66 IN | HEART RATE: 92 BPM | WEIGHT: 128 LBS

## 2024-11-13 DIAGNOSIS — E05.00 GRAVES DISEASE: Primary | ICD-10-CM

## 2024-11-13 DIAGNOSIS — M54.2 CERVICALGIA: ICD-10-CM

## 2024-11-13 DIAGNOSIS — Z23 FLU VACCINE NEED: ICD-10-CM

## 2024-11-13 PROCEDURE — 3044F HG A1C LEVEL LT 7.0%: CPT | Mod: CPTII,,, | Performed by: FAMILY MEDICINE

## 2024-11-13 PROCEDURE — 99214 OFFICE O/P EST MOD 30 MIN: CPT | Mod: S$PBB,,, | Performed by: FAMILY MEDICINE

## 2024-11-13 PROCEDURE — 1159F MED LIST DOCD IN RCRD: CPT | Mod: CPTII,,, | Performed by: FAMILY MEDICINE

## 2024-11-13 PROCEDURE — 99999PBSHW PR PBB SHADOW TECHNICAL ONLY FILED TO HB: Mod: PBBFAC,,,

## 2024-11-13 PROCEDURE — 72040 X-RAY EXAM NECK SPINE 2-3 VW: CPT | Mod: TC,PN

## 2024-11-13 PROCEDURE — 99214 OFFICE O/P EST MOD 30 MIN: CPT | Mod: PBBFAC,25,PN | Performed by: FAMILY MEDICINE

## 2024-11-13 PROCEDURE — 3074F SYST BP LT 130 MM HG: CPT | Mod: CPTII,,, | Performed by: FAMILY MEDICINE

## 2024-11-13 PROCEDURE — 90656 IIV3 VACC NO PRSV 0.5 ML IM: CPT | Mod: PBBFAC,PN

## 2024-11-13 PROCEDURE — 99999 PR PBB SHADOW E&M-EST. PATIENT-LVL IV: CPT | Mod: PBBFAC,,, | Performed by: FAMILY MEDICINE

## 2024-11-13 PROCEDURE — 3008F BODY MASS INDEX DOCD: CPT | Mod: CPTII,,, | Performed by: FAMILY MEDICINE

## 2024-11-13 PROCEDURE — 72040 X-RAY EXAM NECK SPINE 2-3 VW: CPT | Mod: 26,,, | Performed by: RADIOLOGY

## 2024-11-13 PROCEDURE — 90471 IMMUNIZATION ADMIN: CPT | Mod: PBBFAC,PN

## 2024-11-13 PROCEDURE — 3079F DIAST BP 80-89 MM HG: CPT | Mod: CPTII,,, | Performed by: FAMILY MEDICINE

## 2024-11-13 RX ADMIN — INFLUENZA VIRUS VACCINE 0.5 ML: 15; 15; 15 SUSPENSION INTRAMUSCULAR at 09:11

## 2024-11-13 NOTE — PROGRESS NOTES
Subjective:      Chief Complaint   Patient presents with    Other Misc     Neck pain (6 months)/ thyroid check/ MED REFILLS      Patient ID: Aury Dupree is a 34 y.o. female.  History of Present Illness    CHIEF COMPLAINT:  Aury presents for follow-up of her thyroid condition and to discuss neck pain that has been bothering her for 6 months.    HPI:  Aury has a history of Graves' disease that developed after receiving a COVID vaccine. She reports being informed the condition resolved, but she is uncertain. She has been taking atenolol, which has effectively managed symptoms including palpitations.     Aury complains of neck pain present for approximately 6 months. She has pain and stiffness, particularly when turning her head to one side. The pain is described as a sensation of restricted movement with clicking, most severe when looking in one particular direction. She has a history of fibromyalgia and long-standing neck pain. Typically, massage, stretching, ice, and time alleviate her neck pain, but these interventions have been ineffective for her current neck pain. 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    Aury denies any current symptoms of hyperthyroidism or having Hashimoto's thyroiditis.    MEDICATIONS:  Aury is on Atenolol, which is effective for anxiety and palpitations related to her Graves' disease.    MEDICAL HISTORY:  Aury has a history of Graves' disease, which developed after receiving the COVID vaccine but has since resolved. She also has a history of Hashimoto's thyroiditis and fibromyalgia. She has received the COVID-19 vaccine in the past.    TEST RESULTS:  Aury's thyroid function tests have shown normal results for a long time.   A cholesterol panel was conducted, showing overall good results. Her HDL was 41, which was noted as low and should be above 50. LDL was mentioned but the specific value  was not provided.    SOCIAL HISTORY:  Aury works in [employer], which involves significant computer use.    Answers submitted by the patient for this visit:  Review of Systems Questionnaire (Submitted on 11/7/2024)  activity change: No  unexpected weight change: No  neck pain: Yes  hearing loss: No  rhinorrhea: No  trouble swallowing: No  eye discharge: No  visual disturbance: No  chest tightness: No  wheezing: No  chest pain: No  palpitations: No  blood in stool: No  constipation: No  vomiting: No  diarrhea: No  polydipsia: No  polyuria: No  difficulty urinating: No  hematuria: No  menstrual problem: No  dysuria: No  joint swelling: No  arthralgias: No  headaches: No  weakness: No  confusion: No  dysphoric mood: No    ROS:  General: -fever, -chills, -fatigue, -weight gain, -weight loss  Eyes: -vision changes, -redness, -discharge  ENT: -ear pain, -nasal congestion, -sore throat  Cardiovascular: -chest pain, +palpitations, -lower extremity edema  Respiratory: -cough, -shortness of breath  Gastrointestinal: -abdominal pain, -nausea, -vomiting, -diarrhea, -constipation, -blood in stool  Genitourinary: -dysuria, -hematuria, -frequency  Musculoskeletal: -joint pain, -muscle pain, +neck pain  Skin: -rash, -lesion  Neurological: -headache, -dizziness, -numbness, -tingling  Psychiatric: +anxiety, -depression, -sleep difficulty       Aury Gibbs allergies, medications, history, and problem list were updated as appropriate.  Past Medical History:   Diagnosis Date    Fibromyalgia 09/2021    Last month    Graves disease     Hashimoto's disease     diagnosed this year in march     Family History   Problem Relation Name Age of Onset    Breast cancer Paternal Grandmother      Colon cancer Neg Hx      Ovarian cancer Neg Hx       Social History     Socioeconomic History    Marital status:    Tobacco Use    Smoking status: Never     Passive exposure: Never    Smokeless tobacco: Never   Substance and Sexual Activity     Alcohol use: Never    Drug use: Never    Sexual activity: Yes     Partners: Male     Birth control/protection: OCP, Partner-Vasectomy     Social Drivers of Health     Financial Resource Strain: Low Risk  (6/25/2024)    Overall Financial Resource Strain (CARDIA)     Difficulty of Paying Living Expenses: Not very hard   Food Insecurity: No Food Insecurity (6/25/2024)    Hunger Vital Sign     Worried About Running Out of Food in the Last Year: Never true     Ran Out of Food in the Last Year: Never true   Transportation Needs: No Transportation Needs (5/13/2024)    PRAPARE - Transportation     Lack of Transportation (Medical): No     Lack of Transportation (Non-Medical): No   Physical Activity: Sufficiently Active (6/25/2024)    Exercise Vital Sign     Days of Exercise per Week: 3 days     Minutes of Exercise per Session: 60 min   Stress: No Stress Concern Present (6/25/2024)    [employer] - Occupational Stress Questionnaire     Feeling of Stress : Only a little   Housing Stability: Low Risk  (10/6/2023)    Housing Stability Vital Sign     Unable to Pay for Housing in the Last Year: No     Number of Places Lived in the Last Year: 1     Unstable Housing in the Last Year: No     Outpatient Encounter Medications as of 11/13/2024   Medication Sig Dispense Refill    atenoloL (TENORMIN) 25 MG tablet Take 1 tablet by mouth twice daily 60 tablet 0    EPINEPHrine (EPIPEN) 0.3 mg/0.3 mL AtIn Inject 0.3 mLs (0.3 mg total) into the muscle as needed (allergic reaction). 1 each 0    FLUoxetine 20 MG capsule Take 3 capsules (60 mg total) by mouth once daily. 90 capsule 1    norethindrone-ethinyl estradiol (FEMHRT 1/5) 1-5 mg-mcg Tab Take 1 tablet by mouth once daily. 84 tablet 4    azelastine (ASTELIN) 137 mcg (0.1 %) nasal spray 1 spray (137 mcg total) by Nasal route 2 (two) times daily. (Patient not taking: Reported on 11/13/2024) 30 mL 3    [DISCONTINUED] atenoloL (TENORMIN) 25 MG tablet Take 1 tablet  "by mouth twice daily 60 tablet 0     Facility-Administered Encounter Medications as of 11/13/2024   Medication Dose Route Frequency Provider Last Rate Last Admin    [COMPLETED] influenza (Flulaval, Fluzone, Fluarix) 45 mcg/0.5 mL IM vaccine (> or = 6 mo) 0.5 mL  0.5 mL Intramuscular 1 time in Clinic/HOD    0.5 mL at 11/13/24 0941          Objective:      /86 (BP Location: Left arm, Patient Position: Sitting)   Pulse 92   Temp 97.3 °F (36.3 °C)   Ht 5' 6" (1.676 m)   Wt 58.1 kg (128 lb)   SpO2 97%   BMI 20.66 kg/m²   Physical Exam  Vitals and nursing note reviewed.   Constitutional:       General: She is not in acute distress.  HENT:      Head: Normocephalic and atraumatic.   Cardiovascular:      Rate and Rhythm: Normal rate and regular rhythm.      Pulses: Normal pulses.      Heart sounds: No murmur heard.  Pulmonary:      Effort: Pulmonary effort is normal. No respiratory distress.      Breath sounds: Normal breath sounds. No wheezing or rhonchi.   Musculoskeletal:         General: No swelling or deformity.      Cervical back: Tenderness (SCM) present. Decreased range of motion (limited to left sideways rotations cw with strain).        Back:       Right lower leg: No edema.      Left lower leg: No edema.   Neurological:      General: No focal deficit present.      Mental Status: She is alert.      Cranial Nerves: No cranial nerve deficit.   Psychiatric:         Behavior: Behavior normal.         Thought Content: Thought content normal.        Physical Exam    Neck: No abnormalities detected on neck palpation.        Results for orders placed or performed in visit on 05/15/24   CBC Auto Differential    Collection Time: 05/15/24  8:52 AM   Result Value Ref Range    WBC 6.90 3.90 - 12.70 K/uL    RBC 4.36 4.00 - 5.40 M/uL    Hemoglobin 13.9 12.0 - 16.0 g/dL    Hematocrit 42.9 37.0 - 48.5 %    MCV 98 82 - 98 fL    MCH 31.9 (H) 27.0 - 31.0 pg    MCHC 32.4 32.0 - 36.0 g/dL    RDW 12.1 11.5 - 14.5 %    " Platelets 268 150 - 450 K/uL    MPV 10.1 9.2 - 12.9 fL    Immature Granulocytes 0.3 0.0 - 0.5 %    Gran # (ANC) 4.3 1.8 - 7.7 K/uL    Immature Grans (Abs) 0.02 0.00 - 0.04 K/uL    Lymph # 1.6 1.0 - 4.8 K/uL    Mono # 0.5 0.3 - 1.0 K/uL    Eos # 0.5 0.0 - 0.5 K/uL    Baso # 0.04 0.00 - 0.20 K/uL    nRBC 0 0 /100 WBC    Gran % 62.6 38.0 - 73.0 %    Lymph % 22.5 18.0 - 48.0 %    Mono % 6.5 4.0 - 15.0 %    Eosinophil % 7.5 0.0 - 8.0 %    Basophil % 0.6 0.0 - 1.9 %    Differential Method Automated    Comprehensive Metabolic Panel    Collection Time: 05/15/24  8:52 AM   Result Value Ref Range    Sodium 137 136 - 145 mmol/L    Potassium 4.4 3.5 - 5.1 mmol/L    Chloride 106 95 - 110 mmol/L    CO2 21 (L) 23 - 29 mmol/L    Glucose 74 70 - 110 mg/dL    BUN 13 6 - 20 mg/dL    Creatinine 0.8 0.5 - 1.4 mg/dL    Calcium 10.0 8.7 - 10.5 mg/dL    Total Protein 7.4 6.0 - 8.4 g/dL    Albumin 3.9 3.5 - 5.2 g/dL    Total Bilirubin 0.3 0.1 - 1.0 mg/dL    Alkaline Phosphatase 64 55 - 135 U/L    AST 24 10 - 40 U/L    ALT 13 10 - 44 U/L    eGFR >60.0 >60 mL/min/1.73 m^2    Anion Gap 10 8 - 16 mmol/L   Lipid Panel    Collection Time: 05/15/24  8:52 AM   Result Value Ref Range    Cholesterol 169 120 - 199 mg/dL    Triglycerides 122 30 - 150 mg/dL    HDL 35 (L) 40 - 75 mg/dL    LDL Cholesterol 109.6 63.0 - 159.0 mg/dL    HDL/Cholesterol Ratio 20.7 20.0 - 50.0 %    Total Cholesterol/HDL Ratio 4.8 2.0 - 5.0    Non-HDL Cholesterol 134 mg/dL   Hemoglobin A1C    Collection Time: 05/15/24  8:52 AM   Result Value Ref Range    Hemoglobin A1C 4.8 4.0 - 5.6 %    Estimated Avg Glucose 91 68 - 131 mg/dL   TSH    Collection Time: 05/15/24  8:52 AM   Result Value Ref Range    TSH 1.044 0.400 - 4.000 uIU/mL   T3, Free    Collection Time: 05/15/24  8:52 AM   Result Value Ref Range    T3, Free 2.7 2.3 - 4.2 pg/mL   T4, Free    Collection Time: 05/15/24  8:52 AM   Result Value Ref Range    Free T4 0.95 0.71 - 1.51 ng/dL     Assessment/Plan:         1. Graves  disease    2. Flu vaccine need    3. Cervicalgia      Graves disease  -     TSH; Future; Expected date: 11/13/2024  -     T4, Free; Future; Expected date: 11/13/2024  -     T3, Free; Future; Expected date: 11/13/2024  -     THYROID PEROXIDASE ANTIBODY; Future; Expected date: 11/13/2024  -     THYROTROPIN RECEPTOR ANTIBODY; Future; Expected date: 11/13/2024    Flu vaccine need  -     influenza (Flulaval, Fluzone, Fluarix) 45 mcg/0.5 mL IM vaccine (> or = 6 mo) 0.5 mL    Cervicalgia  -     Cancel: X-Ray Cervical Spine AP And Lateral; Future; Expected date: 11/13/2024      GRAVES' DISEASE:  Assessed patient's thyroid function and history of Graves' disease.  Thyroid function tests and antibody tests for Graves' disease ordered.    ANXIETY AND PALPITATIONS:  Evaluated effectiveness of atenolol in managing anxiety and palpitations.      Reviewed cholesterol levels, noting low HDL.  Explained the role of HDL cholesterol in heart protection.  Aury to increase fiber intake through diet or supplements to improve HDL levels.  Recommend engaging in routine exercise to boost HDL cholesterol.    NECK PAIN:  Considered neck pain etiology and treatment options.  Determined need for neck x-ray to evaluate for narrowing.  Discussed the importance of proper sleeping posture for neck pain management.  Educated on the impact of prolonged computer use on neck strain.  Aury to adjust computer setup to promote better neck posture.  Recommend switching sides when working on the computer to reduce neck strain.  Aury can use heating pads for neck pain relief.  Started anti-inflammatory medication for neck pain.  X-ray of the neck ordered.  Referred to Physical therapy for neck pain management and stretching exercises.    FOLLOW UP:  Follow up if neck pain persists after completing physical therapy.            I have reviewed all of the patient's clinical history available in care everywhere and Epic and have utilized this in my evaluation  and management recommendations today.      Treatment options and alternatives were discussed with the patient. Patient was given ample time to ask questions. All questions were answered. Voices understanding and acceptance of this advice. Will call back if any further questions or concerns.         Portions of the record may have been created with voice recognition software. Occasional wrong-word or sound-a-like substitutions may have occurred due to the inherent limitations of voice recognition software. Read the chart carefully and recognize, using context, where substitutions have occurred.      This note was generated with the assistance of ambient listening technology. Verbal consent was obtained by the patient and accompanying visitor(s) for the recording of patient appointment to facilitate this note. I attest to having reviewed and edited the generated note for accuracy, though some syntax or spelling errors may persist. Please contact the author of this note for any clarification.            Velvet Reid MD  [facility],

## 2024-11-20 ENCOUNTER — PATIENT MESSAGE (OUTPATIENT)
Dept: PRIMARY CARE CLINIC | Facility: CLINIC | Age: 34
End: 2024-11-20
Payer: MEDICAID

## 2024-12-03 RX ORDER — ATENOLOL 25 MG/1
25 TABLET ORAL 2 TIMES DAILY
Qty: 60 TABLET | Refills: 0 | Status: SHIPPED | OUTPATIENT
Start: 2024-12-03

## 2024-12-11 ENCOUNTER — PATIENT MESSAGE (OUTPATIENT)
Dept: PRIMARY CARE CLINIC | Facility: CLINIC | Age: 34
End: 2024-12-11
Payer: MEDICAID

## 2024-12-12 ENCOUNTER — PATIENT MESSAGE (OUTPATIENT)
Dept: RESEARCH | Facility: HOSPITAL | Age: 34
End: 2024-12-12
Payer: MEDICAID

## 2024-12-13 DIAGNOSIS — M54.2 CERVICALGIA: Primary | ICD-10-CM

## 2024-12-19 ENCOUNTER — CLINICAL SUPPORT (OUTPATIENT)
Dept: REHABILITATION | Facility: HOSPITAL | Age: 34
End: 2024-12-19
Attending: FAMILY MEDICINE
Payer: MEDICAID

## 2024-12-19 DIAGNOSIS — M54.2 CERVICALGIA: ICD-10-CM

## 2024-12-19 DIAGNOSIS — M54.2 NECK PAIN: Primary | ICD-10-CM

## 2024-12-19 DIAGNOSIS — R29.898 DECREASED ROM OF NECK: ICD-10-CM

## 2024-12-19 PROCEDURE — 97161 PT EVAL LOW COMPLEX 20 MIN: CPT | Mod: PN

## 2024-12-19 PROCEDURE — 97112 NEUROMUSCULAR REEDUCATION: CPT | Mod: PN

## 2024-12-19 PROCEDURE — 97530 THERAPEUTIC ACTIVITIES: CPT | Mod: PN

## 2024-12-19 NOTE — PLAN OF CARE
[facility]  Physical Therapy Initial Evaluation    Name: Aury Dupree  Clinic Number: [id number]    Therapy Diagnosis:   Encounter Diagnoses   Name Primary?    Cervicalgia     Neck pain Yes    Decreased ROM of neck      Physician: Velvet Reid MD    Physician Orders: PT Eval and Treat   Medical Diagnosis from Referral: M54.2 (ICD-10-CM) - Cervicalgia   Evaluation Date: 12/19/2024  Authorization Period Expiration: 12/31/24  Plan of Care Expiration: 2/25/25  Visit # / Visits authorized: 1/ 1  Foto: 1/3  Progress note due 30 days from 12/19/24    Precautions: Standard, graves, hashimoto's, fibromyalgia    Time In: 8:00 am  Time Out: 8:50 am  Total Billable Time: 50 minutes      Subjective   Date of onset: 1 year  History of current condition - Aury reports: neck pain began with no known cause. Pt has a hx of fibromyalgia and works at a computer a lot. 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 The guitar strap was bothering her but now she has the guitar unweighted.     Pain:  Current 3/10, worst 5/10, best 3/10   Location: neck, L shoulder  Description: sore, ache, stiff  Aggravating Factors:looking left  Easing Factors: medication, stretches, ice, heat and massage    Prior Therapy: yes  Social History:  lives with their spouse  Occupation: 3 d printer manager  Prior Level of Function: playing guitar, bertha on a keyboard, reading  Current Level of Function: pain with working at the computer and bertha and using her guitar    Imaging: Vertebral body height and alignment are maintained. The disc spaces are normal in height. No acute fracture is evident. No prevertebral soft tissue swelling.     Medical History:   Past Medical History:   Diagnosis Date    Fibromyalgia 09/2021    Last month    Graves disease     Hashimoto's disease     diagnosed this year in march       Surgical History:   Aury Dupree  has no past surgical history on file.    Medications:   Aury has a current medication list which  includes the following prescription(s): atenolol, azelastine, epinephrine, fluoxetine, and norethindrone-ethinyl estradiol.    Allergies:   Review of patient's allergies indicates:   Allergen Reactions    Nuts [tree nut]     Cetirizine Hives    Hydroxyzine Hives    Levocetirizine Hives    Loratadine Hives        Pts goals: less pain    Objective       CMS Impairment/Limitation/Restriction for FOTO neck Survey    Therapist reviewed FOTO scores for Aury Dupree on 12/19/2024.   FOTO documents entered into [facility] - see Media section.    Functional Score: 57         Posture: Pt noted to present with mild forward head/rounded shoulder posture.  B scapula elevated and protracted. PT noted pt is very hypermobile in the spine and shoulders with ROM assessment.     Cervical Spine AROM:   Degrees Pain   Flexion 40 y   Extend 80 y   R side bend 45 y   L side bend 35 y   R rotation 80 y   L rotation 75 y   Upper cervical flexion 70 %; Rotation full    Shoulder ROM:    Active/Passive Joint Range Right Left   Flexion 190 190   ABDuction 180 180   External Rotation 90 90   Internal Rotation full full   Adduction full full   Extension       Pain with motions of reaching overhead and turning L at neck.      Strength:  Muscle (Myotome) Right Left   Cervical Deep neck Flexor hold time 10 sec/32 sec    Shoulder Abduction 5 5   Elbow Flexors (C5) 5 5   Wrist Extensors (C6) 5 5   Elbow Extensors (C7) 5 5   ER (arm at side) 5 at 0 abduction; 4+ at 90 degrees 5 at 0 abduction; 4+ at 90 degrees   IR (arm at side) 5 5   Thumb extensors 5 5   Intrinsic strength 5 5   Mid traps 4/5 B  Lower traps 2+/5 B  Flexion R 5/5; L 4+/5    Sensation: Intact to light touch    Palpation:  L suboccipitals, upper trap    TREATMENT   Treatment Time In: 8:20 am  Treatment Time Out: 9:00 am  Total Treatment time separate from Evaluation: 40 minutes    Aury participated in neuromuscular re-education activities to improve: Kinesthetic, Proprioception, and  Posture for 30 minutes. The following activities were included:    Supine chin tuck 2x 10  Supine chin tuck with retraction 2x 10  Supine chin tuck with retraction and rotation 2x 10  Supine scapular protract/retract with depression 2x 10  Supine cervical flexion 1x 5; hold deep neck flexor hold 4x 10 sec hold     Aury participated in dynamic functional therapeutic activities to improve functional performance for 10  minutes, including:    PT educated pt on computer positioning to reduce neck and shoulder and R UE pain      Home Exercises and Patient Education Provided    Education provided:   -Education on condition, HEP, and see above.    Written Home Exercises Provided: Patient instructed to cont prior HEP.  Exercises were reviewed and Aury was able to demonstrate them prior to the end of the session.  Aury demonstrated good  understanding of the education provided.     See EMR under Patient Instructions for exercises provided 12/19/2024.    Assessment   Aury is a 34 y.o. female referred to outpatient Physical Therapy with a medical diagnosis of M54.2 (ICD-10-CM) - Cervicalgia , pt presents with signs and symptoms consistent with diagnosis along with neck ROM loss, neck pain, shoulder weakness, decreased functional mobility and endurance. The patient presents with impairments which include impairments list: ROM, strength, endurance, joint mobility, muscle length, balance, posture, gait mechanics, core strength and stability, and functional movement patterns.  These impairments are limiting patient's ability to perform work and home tasks and guitar playing.     Pt prognosis is Good due to personal factors and co-morbidities listed below.   Pt will benefit from skilled outpatient Physical Therapy to address the deficits stated above and in the chart below, provide pt/family education, and to maximize pt's level of independence.     Plan of care discussed with patient: Yes  Pt's spiritual, cultural and  educational needs considered and patient is agreeable to the plan of care and goals as stated below:     Anticipated Barriers for therapy: lots of computer bertha and work      Medical Necessity is demonstrated by the following  History  Co-morbidities and personal factors that may impact the plan of care [x] LOW: no personal factors / co-morbidities  [] MODERATE: 1-2 personal factors / co-morbidities  [] HIGH: 3+ personal factors / co-morbidities    Moderate / High Support Documentation:   Co-morbidities affecting plan of care: fibromyalgia    Personal Factors:   lifestyle     Examination  Body Structures and Functions, activity limitations and participation restrictions that may impact the plan of care [x] LOW: addressing 1-2 elements  [] MODERATE: 3+ elements  [] HIGH: 4+ elements (please support below)    Moderate / High Support Documentation: na     Clinical Presentation [x] LOW: stable  [] MODERATE: Evolving  [] HIGH: Unstable     Decision Making/ Complexity Score: low         Goals:  Short Term Goals: In 4 weeks   1.Pt to be educated on HEP.  2.Patient to increase cervical flexion to 60 degrees or better    3.Patient to increase strength by 1/2 grade.  4.Patient to have pain less than 3/10 or better at all times.  5.Patient to improve score on the FOTO by 20%.  6. Pt to be educated on postural and body mechanics awareness.    Long Term Goals: In 10 weeks 2/25/25  1. Patient to improve score on the FOTO to 64.  2. Patient to demo increase in UE strength to 4/5 in the lower traps for better  3. Patient to have decreased pain to 2/10 at all times.  4. Patient to perform daily activities including dead lift up to 40 # and overhead lifting up to 5 # or better.      Plan   Plan of care Certification: 12/19/2024 to 2/25/25.    Outpatient Physical Therapy 2 times weekly for 10 weeks to include the following interventions: Cervical/Lumbar Traction, Electrical Stimulation IFC, NMES, Gait Training, Manual Therapy, Moist  Heat/ Ice, Neuromuscular Re-ed, Patient Education, Self Care, Therapeutic Activities, Therapeutic Exercise, and DN.     Sparkle Duron, PT, CIDN, SFMA, CEAS-1    Thank you for this referral.    These services are reasonable and necessary for the conditions set forth above while under my care.

## 2024-12-19 NOTE — PROGRESS NOTES
[facility]  Physical Therapy Initial Evaluation    Name: Aury Dupree  Clinic Number: [id number]    Therapy Diagnosis:   Encounter Diagnoses   Name Primary?    Cervicalgia     Neck pain Yes    Decreased ROM of neck      Physician: Velvet Reid MD    Physician Orders: PT Eval and Treat   Medical Diagnosis from Referral: M54.2 (ICD-10-CM) - Cervicalgia   Evaluation Date: 12/19/2024  Authorization Period Expiration: 12/31/24  Plan of Care Expiration: 2/25/25  Visit # / Visits authorized: 1/ 1  Foto: 1/3  Progress note due 30 days from 12/19/24    Precautions: Standard, graves, hashimoto's, fibromyalgia    Time In: 8:00 am  Time Out: 8:50 am  Total Billable Time: 50 minutes      Subjective   Date of onset: 1 year  History of current condition - Aury reports: neck pain began with no known cause. Pt has a hx of fibromyalgia and works at a computer a lot. 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 The guitar strap was bothering her but now she has the guitar unweighted.     Pain:  Current 3/10, worst 5/10, best 3/10   Location: neck, L shoulder  Description: sore, ache, stiff  Aggravating Factors:looking left  Easing Factors: medication, stretches, ice, heat and massage    Prior Therapy: yes  Social History:  lives with their spouse  Occupation: 3 d printer manager  Prior Level of Function: playing guitar, bertha on a keyboard, reading  Current Level of Function: pain with working at the computer and bertha and using her guitar    Imaging: Vertebral body height and alignment are maintained. The disc spaces are normal in height. No acute fracture is evident. No prevertebral soft tissue swelling.     Medical History:   Past Medical History:   Diagnosis Date    Fibromyalgia 09/2021    Last month    Graves disease     Hashimoto's disease     diagnosed this year in march       Surgical History:   Aury Dupree  has no past surgical history on file.    Medications:   Aury has a current medication list which  includes the following prescription(s): atenolol, azelastine, epinephrine, fluoxetine, and norethindrone-ethinyl estradiol.    Allergies:   Review of patient's allergies indicates:   Allergen Reactions    Nuts [tree nut]     Cetirizine Hives    Hydroxyzine Hives    Levocetirizine Hives    Loratadine Hives        Pts goals: less pain    Objective       CMS Impairment/Limitation/Restriction for FOTO neck Survey    Therapist reviewed FOTO scores for Aury Dupree on 12/19/2024.   FOTO documents entered into [facility] - see Media section.    Functional Score: 57         Posture: Pt noted to present with mild forward head/rounded shoulder posture.  B scapula elevated and protracted. PT noted pt is very hypermobile in the spine and shoulders with ROM assessment.     Cervical Spine AROM:   Degrees Pain   Flexion 40 y   Extend 80 y   R side bend 45 y   L side bend 35 y   R rotation 80 y   L rotation 75 y   Upper cervical flexion 70 %; Rotation full    Shoulder ROM:    Active/Passive Joint Range Right Left   Flexion 190 190   ABDuction 180 180   External Rotation 90 90   Internal Rotation full full   Adduction full full   Extension       Pain with motions of reaching overhead and turning L at neck.      Strength:  Muscle (Myotome) Right Left   Cervical Deep neck Flexor hold time 10 sec/32 sec    Shoulder Abduction 5 5   Elbow Flexors (C5) 5 5   Wrist Extensors (C6) 5 5   Elbow Extensors (C7) 5 5   ER (arm at side) 5 at 0 abduction; 4+ at 90 degrees 5 at 0 abduction; 4+ at 90 degrees   IR (arm at side) 5 5   Thumb extensors 5 5   Intrinsic strength 5 5   Mid traps 4/5 B  Lower traps 2+/5 B  Flexion R 5/5; L 4+/5    Sensation: Intact to light touch    Palpation:  L suboccipitals, upper trap    TREATMENT   Treatment Time In: 8:20 am  Treatment Time Out: 9:00 am  Total Treatment time separate from Evaluation: 40 minutes    Aury participated in neuromuscular re-education activities to improve: Kinesthetic, Proprioception, and  Posture for 30 minutes. The following activities were included:    Supine chin tuck 2x 10  Supine chin tuck with retraction 2x 10  Supine chin tuck with retraction and rotation 2x 10  Supine scapular protract/retract with depression 2x 10  Supine cervical flexion 1x 5; hold deep neck flexor hold 4x 10 sec hold     Aury participated in dynamic functional therapeutic activities to improve functional performance for 10  minutes, including:    PT educated pt on computer positioning to reduce neck and shoulder and R UE pain      Home Exercises and Patient Education Provided    Education provided:   -Education on condition, HEP, and see above.    Written Home Exercises Provided: Patient instructed to cont prior HEP.  Exercises were reviewed and Aury was able to demonstrate them prior to the end of the session.  Aury demonstrated good  understanding of the education provided.     See EMR under Patient Instructions for exercises provided 12/19/2024.    Assessment   Aury is a 34 y.o. female referred to outpatient Physical Therapy with a medical diagnosis of M54.2 (ICD-10-CM) - Cervicalgia , pt presents with signs and symptoms consistent with diagnosis along with neck ROM loss, neck pain, shoulder weakness, decreased functional mobility and endurance. The patient presents with impairments which include impairments list: ROM, strength, endurance, joint mobility, muscle length, balance, posture, gait mechanics, core strength and stability, and functional movement patterns.  These impairments are limiting patient's ability to perform work and home tasks and guitar playing.     Pt prognosis is Good due to personal factors and co-morbidities listed below.   Pt will benefit from skilled outpatient Physical Therapy to address the deficits stated above and in the chart below, provide pt/family education, and to maximize pt's level of independence.     Plan of care discussed with patient: Yes  Pt's spiritual, cultural and  educational needs considered and patient is agreeable to the plan of care and goals as stated below:     Anticipated Barriers for therapy: lots of computer bertha and work      Medical Necessity is demonstrated by the following  History  Co-morbidities and personal factors that may impact the plan of care [x] LOW: no personal factors / co-morbidities  [] MODERATE: 1-2 personal factors / co-morbidities  [] HIGH: 3+ personal factors / co-morbidities    Moderate / High Support Documentation:   Co-morbidities affecting plan of care: fibromyalgia    Personal Factors:   lifestyle     Examination  Body Structures and Functions, activity limitations and participation restrictions that may impact the plan of care [x] LOW: addressing 1-2 elements  [] MODERATE: 3+ elements  [] HIGH: 4+ elements (please support below)    Moderate / High Support Documentation: na     Clinical Presentation [x] LOW: stable  [] MODERATE: Evolving  [] HIGH: Unstable     Decision Making/ Complexity Score: low         Goals:  Short Term Goals: In 4 weeks   1.Pt to be educated on HEP.  2.Patient to increase cervical flexion to 60 degrees or better    3.Patient to increase strength by 1/2 grade.  4.Patient to have pain less than 3/10 or better at all times.  5.Patient to improve score on the FOTO by 20%.  6. Pt to be educated on postural and body mechanics awareness.    Long Term Goals: In 10 weeks 2/25/25  1. Patient to improve score on the FOTO to 64.  2. Patient to demo increase in UE strength to 4/5 in the lower traps for better  3. Patient to have decreased pain to 2/10 at all times.  4. Patient to perform daily activities including dead lift up to 40 # and overhead lifting up to 5 # or better.      Plan   Plan of care Certification: 12/19/2024 to 2/25/25.    Outpatient Physical Therapy 2 times weekly for 10 weeks to include the following interventions: Cervical/Lumbar Traction, Electrical Stimulation IFC, NMES, Gait Training, Manual Therapy, Moist  Heat/ Ice, Neuromuscular Re-ed, Patient Education, Self Care, Therapeutic Activities, Therapeutic Exercise, and DN .     Sparkle Duron, PT, CIDN, SFMA, CEAS-1    Thank you for this referral.    These services are reasonable and necessary for the conditions set forth above while under my care.

## 2024-12-23 ENCOUNTER — CLINICAL SUPPORT (OUTPATIENT)
Dept: REHABILITATION | Facility: HOSPITAL | Age: 34
End: 2024-12-23
Payer: MEDICAID

## 2024-12-23 DIAGNOSIS — M54.2 NECK PAIN: Primary | ICD-10-CM

## 2024-12-23 DIAGNOSIS — R29.898 DECREASED ROM OF NECK: ICD-10-CM

## 2024-12-23 PROCEDURE — 97112 NEUROMUSCULAR REEDUCATION: CPT | Mod: PN

## 2024-12-23 PROCEDURE — 97110 THERAPEUTIC EXERCISES: CPT | Mod: PN

## 2024-12-23 NOTE — PROGRESS NOTES
Physical Therapy Daily Treatment Note     Name: Aury Dupree  Clinic Number: [id number]    Therapy Diagnosis:   Encounter Diagnoses   Name Primary?    Neck pain Yes    Decreased ROM of neck      Physician: Velvet Reid MD    Visit Date: 12/23/2024    Physician Orders: PT Eval and Treat   Medical Diagnosis from Referral: M54.2 (ICD-10-CM) - Cervicalgia   Evaluation Date: 12/19/2024  Authorization Period Expiration: 12/31/24  Plan of Care Expiration: 2/25/25  Visit # / Visits authorized: 1/ 1  Foto: 1/3  Progress note due 30 days from 12/19/24     Precautions: Standard, graves, hashimoto's, fibromyalgia    Time In: 7:00 am   Time Out: 8:00 am  Total Time: 60 minutes    SUBJECTIVE     Today, pt reports: she has had a headache on and off since HEP initiated.  She was compliant with home exercise program.  Response to previous treatment: sore, ache, headache  Functional change: none    Pre-Treatment Pain: 3/10  Post-Treatment Pain: 2/10  Location: neck, L low back and   TREATMENT     Aury received therapeutic exercises to develop strength and flexibility for 16 minutes including:    Seated UBE 3 min  Seated rows 25# 3x 10  Standing shoulder shrugs 3x 10 8# per hand for ROM    Aury received the following manual therapy techniques: Myofacial release and Soft tissue Mobilization were applied to the: 0 for 0 minutes, including:        Aury participated in neuromuscular re-education activities to improve: Kinesthetic, Proprioception, and Posture for 44 minutes. The following activities were included:     Supine chin tuck 2x 10  Supine chin tuck with retraction 2x 10  SLR with PPT 2x 10  Qped scapular protract/relax keeping core engaged in flexion 1x 15  Supine scapular protract/retract with depression 2x 10; 3# 2x 10  Seated trunk rotation machine 20 # 2x 10 R to L only  R side lying with L UE 2# 2x 10  Prone T 2x 10 with scapular training  Prone w to Y 2x 10        Deferred:  Supine chin tuck with retraction and  rotation 2x 10  Supine cervical flexion 1x 5; hold deep neck flexor hold 4x 10 sec hold         Aury participated in dynamic functional therapeutic activities to improve functional performance for 0  minutes, including:      Aury participated in gait training to improve functional mobility and safety for 0  minutes, including:      Aury received the following direct contact modalities after being cleared for contraindications:     Aury received the following supervised modalities after being cleared for contradictions:     Aury received hot pack for 0 minutes to 0.    Aury received cold pack for 0 minutes to 0.  Aury received electrical stimulation for 0 minutes to 0      Home Exercises Provided and Patient Education Provided     Education/Self-Care provided: (during therex)    Patient educated on the importance of improved core and upper and lower extremity strength in order to improve alignment of the spine and upper and lower extremities with static positions and dynamic movement.   Patient educated on the importance of strong core and lower extremity musculature in order to improve both static and dynamic balance, improve gait mechanics, reduce fall risk and improve household and community mobility.       Written Home Exercises Provided: Patient instructed to cont prior HEP.  Exercises were reviewed and Aury was able to demonstrate them prior to the end of the session.  Aury demonstrated good  understanding of the education provided.     See EMR under Patient Instructions for exercises provided 12/23/2024.    ASSESSMENT   Pt tolerated therex with PT adding in scapular and core stability work to reduce L shoulder hiking.  Pt tolerated exercise well with reports of increased fatigue but no increased pain. Pt demonstrated good understanding of exercises and required minimal cueing to maintain proper form.      Aury Is progressing well towards her goals.   Pt prognosis is Good.     Pt will continue to  benefit from skilled outpatient physical therapy to address the deficits listed in the problem list box on initial evaluation, provide pt/family education and to maximize pt's level of independence in the home and community environment.     Pt's spiritual, cultural and educational needs considered and pt agreeable to plan of care and goals.     Anticipated barriers to physical therapy: none    Goals:     Short Term Goals: In 4 weeks   1.Pt to be educated on HEP.  2.Patient to increase cervical flexion to 60 degrees or better    3.Patient to increase strength by 1/2 grade.  4.Patient to have pain less than 3/10 or better at all times.  5.Patient to improve score on the FOTO by 20%.  6. Pt to be educated on postural and body mechanics awareness.     Long Term Goals: In 10 weeks 2/25/25  1. Patient to improve score on the FOTO to 64.  2. Patient to demo increase in UE strength to 4/5 in the lower traps for better  3. Patient to have decreased pain to 2/10 at all times.  4. Patient to perform daily activities including dead lift up to 40 # and overhead lifting up to 5 # or better.        Plan   Plan of care Certification: 12/19/2024 to 2/25/25.     Outpatient Physical Therapy 2 times weekly for 10 weeks to include the following interventions: Cervical/Lumbar Traction, Electrical Stimulation IFC, NMES, Gait Training, Manual Therapy, Moist Heat/ Ice, Neuromuscular Re-ed, Patient Education, Self Care, Therapeutic Activities, Therapeutic Exercise, and DN.      Continue Plan of Care (POC) and progress per patient tolerance.    Sparkle Duron, PT, CIDN, SFMA, CEAS-1

## 2024-12-27 ENCOUNTER — CLINICAL SUPPORT (OUTPATIENT)
Dept: REHABILITATION | Facility: HOSPITAL | Age: 34
End: 2024-12-27
Payer: MEDICAID

## 2024-12-27 DIAGNOSIS — R29.898 DECREASED ROM OF NECK: ICD-10-CM

## 2024-12-27 DIAGNOSIS — M54.2 NECK PAIN: Primary | ICD-10-CM

## 2024-12-27 PROCEDURE — 97110 THERAPEUTIC EXERCISES: CPT | Mod: PN

## 2024-12-27 PROCEDURE — 97112 NEUROMUSCULAR REEDUCATION: CPT | Mod: PN

## 2024-12-27 NOTE — PROGRESS NOTES
Physical Therapy Daily Treatment Note     Name: Aury Dupree  Clinic Number: [id number]    Therapy Diagnosis:   Encounter Diagnoses   Name Primary?    Neck pain Yes    Decreased ROM of neck      Physician: Velvet Reid MD    Visit Date: 12/27/2024    Physician Orders: PT Eval and Treat   Medical Diagnosis from Referral: M54.2 (ICD-10-CM) - Cervicalgia   Evaluation Date: 12/19/2024  Authorization Period Expiration: 12/31/24  Plan of Care Expiration: 2/25/25  Visit # / Visits authorized: 1/1  Foto: 1/3  Progress note due 30 days from 12/19/24     Precautions: Standard, graves, hashimoto's, fibromyalgia    Time In: 7:00 am   Time Out: 8:00 am  Total Time: 60 minutes    SUBJECTIVE     Today, pt reports: she is feeling a little better. Feeling like her neck is getting stronger. .  She was compliant with home exercise program.  Response to previous treatment: sore, ache, headache  Functional change: none    Pre-Treatment Pain: 2/10  Post-Treatment Pain: 2/10  Location: neck, L low back and   TREATMENT     Aury received therapeutic exercises to develop strength and flexibility for 15 minutes including:    Seated UBE 3 min  Seated rows 25# 3x 10  Standing shoulder shrugs 3x 10 8# per hand for ROM    Aury received the following manual therapy techniques: Myofacial release and Soft tissue Mobilization were applied to the: 0 for 0 minutes, including:        Aury participated in neuromuscular re-education activities to improve: Kinesthetic, Proprioception, and Posture for 35 minutes. The following activities were included:     Supine chin tuck 2x 10    SLR with PPT 2x 10  Qped scapular protract/relax keeping core engaged in flexion 1x 15  Supine scapular protract/retract with depression 2x 10; 3# 2x 10  Seated trunk rotation machine 20 # 2x 10 R to L only    Prone T 2x 10 with scapular training  Prone w to Y 2x 10  Supine cervical flexion 1x 5; hold deep neck flexor hold 4x 10 sec hold         Aury participated in  dynamic functional therapeutic activities to improve functional performance for 0  minutes, including:      Aury participated in gait training to improve functional mobility and safety for 0  minutes, including:      Aury received the following direct contact modalities after being cleared for contraindications:     Aury received the following supervised modalities after being cleared for contradictions:     Aury received hot pack for 0 minutes to 0.    Aury received cold pack for 0 minutes to 0.  Aury received electrical stimulation for 0 minutes to 0      Home Exercises Provided and Patient Education Provided     Education/Self-Care provided: (during therex)    Patient educated on the importance of improved core and upper and lower extremity strength in order to improve alignment of the spine and upper and lower extremities with static positions and dynamic movement.   Patient educated on the importance of strong core and lower extremity musculature in order to improve both static and dynamic balance, improve gait mechanics, reduce fall risk and improve household and community mobility.       Written Home Exercises Provided: Patient instructed to cont prior HEP.  Exercises were reviewed and Aury was able to demonstrate them prior to the end of the session.  Aury demonstrated good  understanding of the education provided.     See EMR under Patient Instructions for exercises provided 12/23/2024.    ASSESSMENT   VC for appropriate scapular mobility during exercises.     Aury Is progressing well towards her goals.   Pt prognosis is Good.     Pt will continue to benefit from skilled outpatient physical therapy to address the deficits listed in the problem list box on initial evaluation, provide pt/family education and to maximize pt's level of independence in the home and community environment.     Pt's spiritual, cultural and educational needs considered and pt agreeable to plan of care and goals.      Anticipated barriers to physical therapy: none    Goals:     Short Term Goals: In 4 weeks   1.Pt to be educated on HEP.  2.Patient to increase cervical flexion to 60 degrees or better    3.Patient to increase strength by 1/2 grade.  4.Patient to have pain less than 3/10 or better at all times.  5.Patient to improve score on the FOTO by 20%.  6. Pt to be educated on postural and body mechanics awareness.     Long Term Goals: In 10 weeks 2/25/25  1. Patient to improve score on the FOTO to 64.  2. Patient to demo increase in UE strength to 4/5 in the lower traps for better  3. Patient to have decreased pain to 2/10 at all times.  4. Patient to perform daily activities including dead lift up to 40 # and overhead lifting up to 5 # or better.        Plan   Plan of care Certification: 12/19/2024 to 2/25/25.     Outpatient Physical Therapy 2 times weekly for 10 weeks to include the following interventions: Cervical/Lumbar Traction, Electrical Stimulation IFC, NMES, Gait Training, Manual Therapy, Moist Heat/ Ice, Neuromuscular Re-ed, Patient Education, Self Care, Therapeutic Activities, Therapeutic Exercise, and DN.      Continue Plan of Care (POC) and progress per patient tolerance.    Bret Kraus, PT, CIDN, SFMA, CEAS-1

## 2024-12-30 ENCOUNTER — CLINICAL SUPPORT (OUTPATIENT)
Dept: REHABILITATION | Facility: HOSPITAL | Age: 34
End: 2024-12-30
Payer: MEDICAID

## 2024-12-30 DIAGNOSIS — M54.2 NECK PAIN: Primary | ICD-10-CM

## 2024-12-30 DIAGNOSIS — R29.898 DECREASED ROM OF NECK: ICD-10-CM

## 2024-12-30 PROCEDURE — 97110 THERAPEUTIC EXERCISES: CPT | Mod: PN

## 2024-12-30 PROCEDURE — 97112 NEUROMUSCULAR REEDUCATION: CPT | Mod: PN

## 2024-12-30 NOTE — PROGRESS NOTES
Physical Therapy Daily Treatment Note     Name: Aury Dupree  Clinic Number: [id number]    Therapy Diagnosis:   Encounter Diagnoses   Name Primary?    Neck pain Yes    Decreased ROM of neck      Physician: Velvet Reid MD    Visit Date: 12/30/2024    Physician Orders: PT Eval and Treat   Medical Diagnosis from Referral: M54.2 (ICD-10-CM) - Cervicalgia   Evaluation Date: 12/19/2024  Authorization Period Expiration: 12/31/24  Plan of Care Expiration: 2/25/25  Visit # / Visits authorized: 1/ 1  Foto: 1/3 to be assessed on follow up  Progress note due 30 days from 12/19/24     Precautions: Standard, graves, hashimoto's, fibromyalgia    Time In: 11:00 am   Time Out: 11:55 am  Total Time: 55 minutes    SUBJECTIVE     Today, pt reports: less pain since starting HEP now as she feels she is getting stronger.  She was compliant with home exercise program.  Response to previous treatment: sore, ache, headache  Functional change: none    Pre-Treatment Pain: 1/10  Post-Treatment Pain: 1/10  Location: neck, L low back and   TREATMENT     Aury received therapeutic exercises to develop strength and flexibility for 16 minutes including:    Seated UBE 3 min  Seated rows 25# 3x 10  Standing shoulder shrugs 3x 10 8# per hand for ROM    Aury received the following manual therapy techniques: Myofacial release and Soft tissue Mobilization were applied to the: 0 for 0 minutes, including:        Aury participated in neuromuscular re-education activities to improve: Kinesthetic, Proprioception, and Posture for 39 minutes. The following activities were included:     Supine chin tuck 2x 10  Seated chin tuck with retraction 2x 10 with red theraband  Modified on elbow plank with cervical retraction with red theraband 2x 10  Moving Modified plank  on elbows (cat to mid ROM but on elbows to reduce wrist pain) 3x 10 with focus on core engagement  SLR with PPT 2x 10 with core engaged  Qped scapular protract/relax keeping core engaged in  flexion 1x 15  Seated trunk rotation machine 20 # 2x 10 R to L only, L 1x 10 20 #  R shoulder horizontal abduction side lying with L UE 2# 2x 10  Prone T 2x 10 with scapular training  Prone w to Y 2x 10 with yellow theraband for resistance      Deferred:  Supine scapular protract/retract with depression 2x 10; 3# 2x 10  Supine chin tuck with retraction and rotation 2x 10  Supine cervical flexion 1x 5; hold deep neck flexor hold 4x 10 sec hold         Aury participated in dynamic functional therapeutic activities to improve functional performance for 0  minutes, including:      Aury participated in gait training to improve functional mobility and safety for 0  minutes, including:      Aury received the following direct contact modalities after being cleared for contraindications:     Aury received the following supervised modalities after being cleared for contradictions:     Aury received hot pack for 0 minutes to 0.    Aury received cold pack for 0 minutes to 0.  Aury received electrical stimulation for 0 minutes to 0      Home Exercises Provided and Patient Education Provided     Education/Self-Care provided: (during therex)    Patient educated on the importance of improved core and upper and lower extremity strength in order to improve alignment of the spine and upper and lower extremities with static positions and dynamic movement.   Patient educated on the importance of strong core and lower extremity musculature in order to improve both static and dynamic balance, improve gait mechanics, reduce fall risk and improve household and community mobility.       Written Home Exercises Provided: Patient instructed to cont prior HEP.  Exercises were reviewed and Aury was able to demonstrate them prior to the end of the session.  Aury demonstrated good  understanding of the education provided.     See EMR under Patient Instructions for exercises provided 12/23/2024.    ASSESSMENT   Pt tolerated therex with PT  adding in more load to challenge motor control. Pt pain is better and she is progressing well. Pt is even considering she may begin progressing towards some work out tasks at the gym at times. Pt demonstrated good understanding of exercises and required minimal cueing to maintain proper form.      Aury Is progressing well towards her goals.   Pt prognosis is Good.     Pt will continue to benefit from skilled outpatient physical therapy to address the deficits listed in the problem list box on initial evaluation, provide pt/family education and to maximize pt's level of independence in the home and community environment.     Pt's spiritual, cultural and educational needs considered and pt agreeable to plan of care and goals.     Anticipated barriers to physical therapy: none    Goals:     Short Term Goals: In 4 weeks   1.Pt to be educated on HEP. met  2.Patient to increase cervical flexion to 60 degrees or better. progressing  3.Patient to increase strength by 1/2 grade. progressing  4.Patient to have pain less than 3/10 or better at all times. progressing  5.Patient to improve score on the FOTO by 20%.  6. Pt to be educated on postural and body mechanics awareness.     Long Term Goals: In 10 weeks 2/25/25  1. Patient to improve score on the FOTO to 64.  2. Patient to demo increase in UE strength to 4/5 in the lower traps for better  3. Patient to have decreased pain to 2/10 at all times.  4. Patient to perform daily activities including dead lift up to 40 # and overhead lifting up to 5 # or better.        Plan   Plan of care Certification: 12/19/2024 to 2/25/25.     Outpatient Physical Therapy 2 times weekly for 10 weeks to include the following interventions: Cervical/Lumbar Traction, Electrical Stimulation IFC, NMES, Gait Training, Manual Therapy, Moist Heat/ Ice, Neuromuscular Re-ed, Patient Education, Self Care, Therapeutic Activities, Therapeutic Exercise, and DN.      Continue Plan of Care (POC) and progress  per patient tolerance.    Sparkle Duron, PT, CIDN, SFMA, CEAS-1

## 2025-01-02 ENCOUNTER — CLINICAL SUPPORT (OUTPATIENT)
Dept: REHABILITATION | Facility: HOSPITAL | Age: 35
End: 2025-01-02
Payer: MEDICAID

## 2025-01-02 DIAGNOSIS — M54.2 NECK PAIN: Primary | ICD-10-CM

## 2025-01-02 DIAGNOSIS — R29.898 DECREASED ROM OF NECK: ICD-10-CM

## 2025-01-02 PROCEDURE — 97110 THERAPEUTIC EXERCISES: CPT | Mod: PN | Performed by: PHYSICAL THERAPIST

## 2025-01-02 NOTE — PROGRESS NOTES
Physical Therapy Daily Treatment Note     Name: Aury Dupree  Clinic Number: [id number]    Therapy Diagnosis:   Encounter Diagnoses   Name Primary?    Neck pain Yes    Decreased ROM of neck      Physician: Velvet Reid MD    Visit Date: 1/2/2025    Physician Orders: PT Eval and Treat   Medical Diagnosis from Referral: M54.2 (ICD-10-CM) - Cervicalgia   Evaluation Date: 12/19/2024  Authorization Period Expiration: 12/31/25  Plan of Care Expiration: 2/25/25  Visit # / Visits authorized: 1/20  Foto: 2/3 (2nd given on 01/02/25)  Progress note due 30 days from 12/19/24     Precautions: Standard, graves, hashimoto's, fibromyalgia    Time In: 655am   Time Out: 7:50am  Total Time: 55 minutes    SUBJECTIVE     Today, pt reports: less pain and is feeling better.   She was compliant with home exercise program.  Response to previous treatment: sore, ache, headache  Functional change: none    Pre-Treatment Pain: 1/10  Post-Treatment Pain: 1/10  Location: neck, L low back and   TREATMENT     Aury received therapeutic exercises to develop strength and flexibility for 16 minutes including:    Seated UBE 3 min  Seated rows 25# 3x 10  Standing shoulder shrugs 3x 10 8# per hand for ROM    Aury received the following manual therapy techniques: Myofacial release and Soft tissue Mobilization were applied to the: 0 for 0 minutes, including:        Aury participated in neuromuscular re-education activities to improve: Kinesthetic, Proprioception, and Posture for 39 minutes. The following activities were included:     Supine chin tuck 2x 10  Seated chin tuck with retraction 2x 10 with red theraband  Modified on elbow plank with cervical retraction with red theraband 2x 10  Moving Modified plank  on elbows (cat to mid ROM but on elbows to reduce wrist pain) 3x 10 with focus on core engagement  SLR with PPT 2x 10 with core engaged  Qped scapular protract/relax keeping core engaged in flexion 1x 15  Seated trunk rotation machine 20 #  2x 10 R to L only, L 1x 10 20 #  R shoulder horizontal abduction side lying with L UE 2# 2x 10  Prone T 2x 10 with scapular training  Prone w to Y 2x 10 with yellow theraband for resistance      Deferred:  Supine scapular protract/retract with depression 2x 10; 3# 2x 10  Supine chin tuck with retraction and rotation 2x 10  Supine cervical flexion 1x 5; hold deep neck flexor hold 4x 10 sec hold         Aury participated in dynamic functional therapeutic activities to improve functional performance for 0  minutes, including:      Aury participated in gait training to improve functional mobility and safety for 0  minutes, including:      Aury received the following direct contact modalities after being cleared for contraindications:     Aury received the following supervised modalities after being cleared for contradictions:     Aury received hot pack for 0 minutes to 0.    Aury received cold pack for 0 minutes to 0.  Aury received electrical stimulation for 0 minutes to 0      Home Exercises Provided and Patient Education Provided     Education/Self-Care provided: (during therex)    Patient educated on the importance of improved core and upper and lower extremity strength in order to improve alignment of the spine and upper and lower extremities with static positions and dynamic movement.   Patient educated on the importance of strong core and lower extremity musculature in order to improve both static and dynamic balance, improve gait mechanics, reduce fall risk and improve household and community mobility.       Written Home Exercises Provided: Patient instructed to cont prior HEP.  Exercises were reviewed and Aury was able to demonstrate them prior to the end of the session.  Aury demonstrated good  understanding of the education provided.     See EMR under Patient Instructions for exercises provided 12/23/2024.    ASSESSMENT   Aury presents with improved pain levels. Therapy continued to focus on core  and scapular strengthening.  She had improvement with her FOTO numbers. Pt demonstrated good understanding of exercises and required minimal cueing to maintain proper form.      Aury Is progressing well towards her goals.   Pt prognosis is Good.     Pt will continue to benefit from skilled outpatient physical therapy to address the deficits listed in the problem list box on initial evaluation, provide pt/family education and to maximize pt's level of independence in the home and community environment.     Pt's spiritual, cultural and educational needs considered and pt agreeable to plan of care and goals.     Anticipated barriers to physical therapy: none    Goals:     Short Term Goals: In 4 weeks   1.Pt to be educated on HEP. met  2.Patient to increase cervical flexion to 60 degrees or better. progressing  3.Patient to increase strength by 1/2 grade. progressing  4.Patient to have pain less than 3/10 or better at all times. progressing  5.Patient to improve score on the FOTO by 20%.  6. Pt to be educated on postural and body mechanics awareness.     Long Term Goals: In 10 weeks 2/25/25  1. Patient to improve score on the FOTO to 64.  2. Patient to demo increase in UE strength to 4/5 in the lower traps for better  3. Patient to have decreased pain to 2/10 at all times.  4. Patient to perform daily activities including dead lift up to 40 # and overhead lifting up to 5 # or better.        Plan   Plan of care Certification: 12/19/2024 to 2/25/25.     Outpatient Physical Therapy 2 times weekly for 10 weeks to include the following interventions: Cervical/Lumbar Traction, Electrical Stimulation IFC, NMES, Gait Training, Manual Therapy, Moist Heat/ Ice, Neuromuscular Re-ed, Patient Education, Self Care, Therapeutic Activities, Therapeutic Exercise, and DN.      Continue Plan of Care (POC) and progress per patient tolerance.    Shreya Nguyen, PT, DPT, CIDN, SFMA, CEAS-1

## 2025-01-03 RX ORDER — ATENOLOL 25 MG/1
25 TABLET ORAL 2 TIMES DAILY
Qty: 60 TABLET | Refills: 0 | Status: SHIPPED | OUTPATIENT
Start: 2025-01-03

## 2025-01-03 NOTE — PROGRESS NOTES
"  Physical Therapy Daily Treatment Note     Name: Aury Dupree  Clinic Number: [id number]    Therapy Diagnosis:   Encounter Diagnoses   Name Primary?    Neck pain Yes    Decreased ROM of neck        Physician: Vlevet Reid MD    Visit Date: 1/6/2025    Physician Orders: PT Eval and Treat   Medical Diagnosis from Referral: M54.2 (ICD-10-CM) - Cervicalgia   Evaluation Date: 12/19/2024  Authorization Period Expiration: 12/31/25  Plan of Care Expiration: 2/25/25  Visit # / Visits authorized: 2/20  Foto: 2/3 (2nd given on 01/02/25)  Progress note due 30 days from 12/19/24     Precautions: Standard, graves, hashimoto's, fibromyalgia    Time In: 7:00 AM   Time Out: 7:55 AM  Total Time: 55 minutes    SUBJECTIVE     Today, pt reports: Mild B neck pain today. R pain is intermittent but present today.  She was compliant with home exercise program.  Response to previous treatment: sore, ache, headache  Functional change: none    Pre-Treatment Pain: 1/10  Post-Treatment Pain: 1/10  Location: neck, L low back and   TREATMENT     Aury received therapeutic exercises to develop strength and flexibility for 15 minutes including:     UBE (3/3)  UT stretch (10 x 10")  Open books (10 x, YTB & 10x, RTB)     Deferred:  Seated rows 25# 3x 10  Standing shoulder shrugs 3x 10 8# per hand for ROM     Aury received the following manual therapy techniques: Myofacial release and Soft tissue Mobilization were applied to the: 0 for 0 minutes, including:           Aury participated in neuromuscular re-education activities to improve: Kinesthetic, Proprioception, and Posture for 40 minutes. The following activities were included:     Supine chin tuck 10 x 10"  Chin tuck c head lift (5 x 10")  QP chin tucks (2 x 10, YTB)  Sidelying horizontal abduction (2 x 10, 2 lbs)  Shrugs (3 x 10)  B weighted carry (3 laps, 5 lbs)  Straight arm pull downs (2 x 10, 20 lbs)  Standing row (3 x 10, RTB)     Deferred:  Seated chin tuck with retraction 2x 10 " with red theraband  Modified on elbow plank with cervical retraction with red theraband 2x 10  Moving Modified plank on elbows (cat to mid ROM but on elbows to reduce wrist pain) 3x 10 with focus on core engagement  SLR with PPT 2x 10 with core engaged  Qped scapular protract/relax keeping core engaged in flexion 1x 15  Seated trunk rotation machine 20 # 2x 10 R to L only, L 1x 10 20 #  Prone T 2x 10 with scapular training  Prone w to Y 2x 10 with yellow theraband for resistance  Supine scapular protract/retract with depression 2x 10; 3# 2x 10  Supine chin tuck with retraction and rotation 2x 10  Supine cervical flexion 1x 5; hold deep neck flexor hold 4x 10 sec hold         Aury participated in dynamic functional therapeutic activities to improve functional performance for 0 minutes, including:        Aury participated in gait training to improve functional mobility and safety for 0 minutes, including:        Aury received the following direct contact modalities after being cleared for contraindications:      Aury received the following supervised modalities after being cleared for contradictions:      Aury received hot pack for 0 minutes to 0.     Aury received cold pack for 0 minutes to 0.  Aury received electrical stimulation for 0 minutes to 0      Home Exercises Provided and Patient Education Provided     Education/Self-Care provided: (during therex)    Patient educated on the importance of improved core and upper and lower extremity strength in order to improve alignment of the spine and upper and lower extremities with static positions and dynamic movement.   Patient educated on the importance of strong core and lower extremity musculature in order to improve both static and dynamic balance, improve gait mechanics, reduce fall risk and improve household and community mobility.       Written Home Exercises Provided: Patient instructed to cont prior HEP.  Exercises were reviewed and Aury was able to  demonstrate them prior to the end of the session.  Aury demonstrated good  understanding of the education provided.     See EMR under Patient Instructions for exercises provided 12/23/2024.    ASSESSMENT     Pt demonstrates good tolerance to today's exercise. Pt and PT communicated frequently about symptom reproduction/tolerance to exercise. Pt demonstrates good mechanics with exercise performance.     Aury Is progressing well towards her goals.   Pt prognosis is Good.     Pt will continue to benefit from skilled outpatient physical therapy to address the deficits listed in the problem list box on initial evaluation, provide pt/family education and to maximize pt's level of independence in the home and community environment.     Pt's spiritual, cultural and educational needs considered and pt agreeable to plan of care and goals.     Anticipated barriers to physical therapy: none    Goals:     Short Term Goals: In 4 weeks   1.Pt to be educated on HEP. met  2.Patient to increase cervical flexion to 60 degrees or better. progressing  3.Patient to increase strength by 1/2 grade. progressing  4.Patient to have pain less than 3/10 or better at all times. progressing  5.Patient to improve score on the FOTO by 20%.  6. Pt to be educated on postural and body mechanics awareness.     Long Term Goals: In 10 weeks 2/25/25  1. Patient to improve score on the FOTO to 64.  2. Patient to demo increase in UE strength to 4/5 in the lower traps for better  3. Patient to have decreased pain to 2/10 at all times.  4. Patient to perform daily activities including dead lift up to 40 # and overhead lifting up to 5 # or better.        Plan   Plan of care Certification: 12/19/2024 to 2/25/25.     Outpatient Physical Therapy 2 times weekly for 10 weeks to include the following interventions: Cervical/Lumbar Traction, Electrical Stimulation IFC, NMES, Gait Training, Manual Therapy, Moist Heat/ Ice, Neuromuscular Re-ed, Patient Education,  Self Care, Therapeutic Activities, Therapeutic Exercise, and DN.      Continue Plan of Care (POC) and progress per patient tolerance.    Ania Clifford, PT, CIDN, SFMA, CEAS-1

## 2025-01-06 ENCOUNTER — CLINICAL SUPPORT (OUTPATIENT)
Dept: REHABILITATION | Facility: HOSPITAL | Age: 35
End: 2025-01-06
Payer: MEDICAID

## 2025-01-06 DIAGNOSIS — M54.2 NECK PAIN: Primary | ICD-10-CM

## 2025-01-06 DIAGNOSIS — R29.898 DECREASED ROM OF NECK: ICD-10-CM

## 2025-01-06 PROCEDURE — 97110 THERAPEUTIC EXERCISES: CPT | Mod: PN

## 2025-01-06 PROCEDURE — 97112 NEUROMUSCULAR REEDUCATION: CPT | Mod: PN

## 2025-01-07 NOTE — PROGRESS NOTES
"  Physical Therapy Daily Treatment Note     Name: Aury Dupree  Clinic Number: [id number]    Therapy Diagnosis:   Encounter Diagnoses   Name Primary?    Neck pain Yes    Decreased ROM of neck          Physician: Velvet Reid MD    Visit Date: 1/8/2025    Physician Orders: PT Eval and Treat   Medical Diagnosis from Referral: M54.2 (ICD-10-CM) - Cervicalgia   Evaluation Date: 12/19/2024  Authorization Period Expiration: 12/31/25  Plan of Care Expiration: 2/25/25  Visit # / Visits authorized: 3/20  Foto: 2/3 (2nd given on 01/02/25)  Progress note due 30 days from 12/19/24     Precautions: Standard, graves, hashimoto's, fibromyalgia    Time In: 7:00 AM   Time Out: 7:45 AM  Total Time: 45 minutes    SUBJECTIVE     Today, pt reports: Head is feeling heavy today. Started feeling heavy last night after watching TV for an extended period.   She was compliant with home exercise program.  Response to previous treatment: sore, ache, headache  Functional change: none    Pre-Treatment Pain: 1/10  Post-Treatment Pain: 1/10  Location: neck, L low back and   TREATMENT     Aury CHAVEZ received therapeutic exercises to develop strength and flexibility for 10 minutes including:     UBE (3/3)  Open books (10 x, YTB & 10x, RTB)     Deferred:  Seated rows 25# 3x 10  Standing shoulder shrugs 3x 10 8# per hand for ROM     Aury received the following manual therapy techniques: Myofacial release and Soft tissue Mobilization were applied to the: 0 for 0 minutes, including:           Aury participated in neuromuscular re-education activities to improve: Kinesthetic, Proprioception, and Posture for 35 minutes. The following activities were included:     Supine chin tuck 10 x 10"  Chin tuck c head lift (6 x)  Shrugs (10 x no weight, 2 x 10 c 3 lbs)  Josué's (3 x 5)  QP chin tucks (2 x 10)  QP Alt LE ext with chin tuck (2 x 5)  Cat/cow (10 x) - emphasis on finding neutral position in between  Standing row (3 x 10, RTB)  B weighted carry (3 " laps, 5 lbs)        Deferred:  -Sidelying horizontal abduction (2 x 10, 2 lbs)  -Straight arm pull downs (2 x 10, 20 lbs)  Seated chin tuck with retraction 2x 10 with red theraband  Modified on elbow plank with cervical retraction with red theraband 2x 10  Moving Modified plank on elbows (cat to mid ROM but on elbows to reduce wrist pain) 3x 10 with focus on core engagement  SLR with PPT 2x 10 with core engaged  Qped scapular protract/relax keeping core engaged in flexion 1x 15  Seated trunk rotation machine 20 # 2x 10 R to L only, L 1x 10 20 #  Prone T 2x 10 with scapular training  Prone w to Y 2x 10 with yellow theraband for resistance  Supine scapular protract/retract with depression 2x 10; 3# 2x 10  Supine chin tuck with retraction and rotation 2x 10  Supine cervical flexion 1x 5; hold deep neck flexor hold 4x 10 sec hold       Home Exercises Provided and Patient Education Provided     Education/Self-Care provided: (during therex)    Patient educated on the importance of improved core and upper and lower extremity strength in order to improve alignment of the spine and upper and lower extremities with static positions and dynamic movement.   Patient educated on the importance of strong core and lower extremity musculature in order to improve both static and dynamic balance, improve gait mechanics, reduce fall risk and improve household and community mobility.       Written Home Exercises Provided: Patient instructed to cont prior HEP.  Exercises were reviewed and Aury was able to demonstrate them prior to the end of the session.  Aury demonstrated good  understanding of the education provided.     See EMR under Patient Instructions for exercises provided 12/23/2024.    ASSESSMENT     Exercise modified today by decreasing resistance utilized and instead performing increased repetitions of various exercise. Pt tolerates this well, though does report mild wrist pain with QP exercises. Pt demonstrates good  mechanics with a majority of exercise performance but does need assistance to understand neutral spine positioning with QP movements.     Aury Is progressing well towards her goals.   Pt prognosis is Good.     Pt will continue to benefit from skilled outpatient physical therapy to address the deficits listed in the problem list box on initial evaluation, provide pt/family education and to maximize pt's level of independence in the home and community environment.     Pt's spiritual, cultural and educational needs considered and pt agreeable to plan of care and goals.     Anticipated barriers to physical therapy: none    Goals:     Short Term Goals: In 4 weeks   1.Pt to be educated on HEP. met  2.Patient to increase cervical flexion to 60 degrees or better. progressing  3.Patient to increase strength by 1/2 grade. progressing  4.Patient to have pain less than 3/10 or better at all times. progressing  5.Patient to improve score on the FOTO by 20%.  6. Pt to be educated on postural and body mechanics awareness.     Long Term Goals: In 10 weeks 2/25/25  1. Patient to improve score on the FOTO to 64.  2. Patient to demo increase in UE strength to 4/5 in the lower traps for better  3. Patient to have decreased pain to 2/10 at all times.  4. Patient to perform daily activities including dead lift up to 40 # and overhead lifting up to 5 # or better.        Plan   Plan of care Certification: 12/19/2024 to 2/25/25.     Outpatient Physical Therapy 2 times weekly for 10 weeks to include the following interventions: Cervical/Lumbar Traction, Electrical Stimulation IFC, NMES, Gait Training, Manual Therapy, Moist Heat/ Ice, Neuromuscular Re-ed, Patient Education, Self Care, Therapeutic Activities, Therapeutic Exercise, and DN.      Continue Plan of Care (POC) and progress per patient tolerance.    Ania Clifford, PT, CIDN, SFMA, CEAS-1

## 2025-01-08 ENCOUNTER — CLINICAL SUPPORT (OUTPATIENT)
Dept: REHABILITATION | Facility: HOSPITAL | Age: 35
End: 2025-01-08
Payer: MEDICAID

## 2025-01-08 DIAGNOSIS — M54.2 NECK PAIN: Primary | ICD-10-CM

## 2025-01-08 DIAGNOSIS — R29.898 DECREASED ROM OF NECK: ICD-10-CM

## 2025-01-08 PROCEDURE — 97110 THERAPEUTIC EXERCISES: CPT | Mod: PN

## 2025-01-08 PROCEDURE — 97112 NEUROMUSCULAR REEDUCATION: CPT | Mod: PN

## 2025-01-13 ENCOUNTER — CLINICAL SUPPORT (OUTPATIENT)
Dept: REHABILITATION | Facility: HOSPITAL | Age: 35
End: 2025-01-13
Payer: MEDICAID

## 2025-01-13 DIAGNOSIS — M54.2 NECK PAIN: Primary | ICD-10-CM

## 2025-01-13 DIAGNOSIS — R29.898 DECREASED ROM OF NECK: ICD-10-CM

## 2025-01-13 PROCEDURE — 97110 THERAPEUTIC EXERCISES: CPT | Mod: PN

## 2025-01-13 PROCEDURE — 97112 NEUROMUSCULAR REEDUCATION: CPT | Mod: PN

## 2025-01-13 NOTE — PROGRESS NOTES
Physical Therapy Daily Treatment Note     Name: Aury Dupree  Clinic Number: [id number]    Therapy Diagnosis:   Encounter Diagnoses   Name Primary?    Neck pain Yes    Decreased ROM of neck        Physician: Velvet Reid MD    Visit Date: 1/13/2025    Physician Orders: PT Eval and Treat   Medical Diagnosis from Referral: M54.2 (ICD-10-CM) - Cervicalgia   Evaluation Date: 12/19/2024  Authorization Period Expiration: 12/31/25  Plan of Care Expiration: 2/25/25  Visit # / Visits authorized: (4 in 2024) 4/20 (2025)  Foto: 2/3 (2nd given on 01/02/25)  Progress note due 30 days from 12/19/24     Precautions: Standard, graves, hashimoto's, fibromyalgia    Time In: 7:00 AM   Time Out: 8:00 AM  Total Time: 60 minutes with PT and tech assistance    SUBJECTIVE     Today, pt reports: mid back pain with shrugs over the weekend. Having less pain on the L side now. Overall she feels she is improving and having less headaches.    She was compliant with home exercise program.  Response to previous treatment: sore, ache, headache  Functional change: none    Pre-Treatment Pain: 4/10  Post-Treatment Pain: 2/10  Location: neck, L low back and   TREATMENT     Aury CHAVEZ received therapeutic exercises to develop strength and flexibility for 16 minutes including:     UBE backwards only with focus on saxena up to 12 or higher for 5 min (strong enough to now consistently keep the lights on the machine)  Seated rows 25# 1x20  Standing shoulder shrugs 3x 10 8# per hand for ROM       Aury received the following manual therapy techniques: Myofacial release and Soft tissue Mobilization were applied to the: 0 for 0 minutes, including:           Aury participated in neuromuscular re-education activities to improve: Kinesthetic, Proprioception, and Posture for 44 minutes. The following activities were included:     Trunk med x machine 40 # x 15;50# 1x 5 (can increase on follow up)  Cervical med x machine 123# 1x 20  Seated trunk rotation machine  20 # 1x 20  Prone T 2x 10 with scapular training  Prone w to Y 2x 10 with yellow theraband for resistance  Josué's (3 x 5) with RTB    Deferred:  QP Alt LE ext with chin tuck (3 x 5)  B weighted carry (3 laps, 8 lbs)   -Sidelying horizontal abduction (2 x 10, 2 lbs)  -Straight arm pull downs (2 x 10, 20 lbs)  Seated chin tuck with retraction 2x 10 with red theraband  Modified on elbow plank with cervical retraction with red theraband 2x 10  Moving Modified plank on elbows (cat to mid ROM but on elbows to reduce wrist pain) 3x 10 with focus on core engagement  SLR with PPT 2x 10 with core engaged  Qped scapular protract/relax keeping core engaged in flexion 1x 15    Home Exercises Provided and Patient Education Provided     Education/Self-Care provided: (during therex)    Patient educated on the importance of improved core and upper and lower extremity strength in order to improve alignment of the spine and upper and lower extremities with static positions and dynamic movement.   Patient educated on the importance of strong core and lower extremity musculature in order to improve both static and dynamic balance, improve gait mechanics, reduce fall risk and improve household and community mobility.       Written Home Exercises Provided: Patient instructed to cont prior HEP.  Exercises were reviewed and Aury was able to demonstrate them prior to the end of the session.  Aury demonstrated good  understanding of the education provided.     See EMR under Patient Instructions for exercises provided 12/23/2024.    ASSESSMENT     PT continued to focus on stability. Pt tends to compensate through the mid and lower spine with guarded postures unless cued to engage her core during therex. Pt is progressing well as tolerating loading increases. She has been able to advance to some tasks at the gym as well now. PT to continue working to increase load as tolerated as she still has trembling with some simple tasks which may be  related to nutrition as well so PT to discuss further with her.     Aury Is progressing well towards her goals.   Pt prognosis is Good.     Pt will continue to benefit from skilled outpatient physical therapy to address the deficits listed in the problem list box on initial evaluation, provide pt/family education and to maximize pt's level of independence in the home and community environment.     Pt's spiritual, cultural and educational needs considered and pt agreeable to plan of care and goals.     Anticipated barriers to physical therapy: none    Goals:     Short Term Goals: In 4 weeks   1.Pt to be educated on HEP. met  2.Patient to increase cervical flexion to 60 degrees or better. progressing  3.Patient to increase strength by 1/2 grade. progressing  4.Patient to have pain less than 3/10 or better at all times. progressing  5.Patient to improve score on the FOTO by 20%.  6. Pt to be educated on postural and body mechanics awareness.     Long Term Goals: In 10 weeks 2/25/25  1. Patient to improve score on the FOTO to 64.  2. Patient to demo increase in UE strength to 4/5 in the lower traps for better  3. Patient to have decreased pain to 2/10 at all times.  4. Patient to perform daily activities including dead lift up to 40 # and overhead lifting up to 5 # or better.        Plan   Plan of care Certification: 12/19/2024 to 2/25/25.     Outpatient Physical Therapy 2 times weekly for 10 weeks to include the following interventions: Cervical/Lumbar Traction, Electrical Stimulation IFC, NMES, Gait Training, Manual Therapy, Moist Heat/ Ice, Neuromuscular Re-ed, Patient Education, Self Care, Therapeutic Activities, Therapeutic Exercise, and DN.      Continue Plan of Care (POC) and progress per patient tolerance.    Sparkle Duron, PT, CIDN, SFMA, CEAS-1

## 2025-01-15 ENCOUNTER — CLINICAL SUPPORT (OUTPATIENT)
Dept: REHABILITATION | Facility: HOSPITAL | Age: 35
End: 2025-01-15
Payer: MEDICAID

## 2025-01-15 DIAGNOSIS — R29.898 DECREASED ROM OF NECK: ICD-10-CM

## 2025-01-15 DIAGNOSIS — M54.2 NECK PAIN: Primary | ICD-10-CM

## 2025-01-15 PROCEDURE — 97112 NEUROMUSCULAR REEDUCATION: CPT | Mod: PN

## 2025-01-15 PROCEDURE — 97110 THERAPEUTIC EXERCISES: CPT | Mod: PN

## 2025-01-15 NOTE — PROGRESS NOTES
Physical Therapy Daily Treatment Note and Progress Note     Name: Aury Dupree  Clinic Number: [id number]    Therapy Diagnosis:   Encounter Diagnoses   Name Primary?    Neck pain Yes    Decreased ROM of neck        Physician: Velvet Reid MD    Visit Date: 1/15/2025    Physician Orders: PT Eval and Treat   Medical Diagnosis from Referral: M54.2 (ICD-10-CM) - Cervicalgia   Evaluation Date: 12/19/2024  Authorization Period Expiration: 12/31/25  Plan of Care Expiration: 2/25/25  Visit # / Visits authorized: (4 in 2024) 4/20 (2025)  Foto: 2/3 (2nd given on 01/02/25)  Progress note due 30 days from 1/15/25     Precautions: Standard, graves, hashimoto's, fibromyalgia    Time In: 7:06 AM   Time Out: 8:00 AM  Total Time: 54 minutes with PT and tech assistance    SUBJECTIVE     Today, pt reports: mid back pain with shrugs over the weekend. Having less pain on the L side now. Overall she feels she is improving and having less headaches.    She was compliant with home exercise program.  Response to previous treatment: sore, ache, headache  Functional change: none    Pre-Treatment Pain: 4/10  Post-Treatment Pain: 2/10  Location: neck, L low back and       OBJECTIVE       Cervical Spine AROM at eval; today    Degrees Pain   Flexion 40;60 y   Extend 80 y   R side bend 45;45 y   L side bend 35;45 y   R rotation 80;85 y   L rotation 75;85 y   Upper cervical flexion 70 % improved to 80%; Rotation full         Strength:  Muscle (Myotome) Right Left   Cervical Deep neck Flexor hold time 10 sec/32 sec improved to 20 sec     Shoulder Abduction 5 5   Elbow Flexors (C5) 5 5   Wrist Extensors (C6) 5 5   Elbow Extensors (C7) 5 5   ER (arm at side) 5 at 0 abduction; 4+ at 90 degrees 5/5 today 5 at 0 abduction; 4+ at 90 degrees; 5/5 today   IR (arm at side) 5 5   Thumb extensors 5 5   Intrinsic strength 5 5   Mid traps 4/5 B;4+/5 today  Lower traps 2+/5 B;3+/5 today  Flexion R 5/5; L 4+/5; 5/5 today  TREATMENT     Aury CHAVEZ nazanin  therapeutic exercises to develop strength and flexibility for 10 minutes including:     UBE backwards only with focus on saxena up to 12 or higher for 5 min (strong enough to now consistently keep the lights on the machine) PT had pt use 1/2 vinny roll while sitting to help support the back after training in use (5 min)    Deferred:  Seated rows 25# 1x20  Standing shoulder shrugs 3x 10 8# per hand for ROM       Aury received the following manual therapy techniques: Myofacial release and Soft tissue Mobilization were applied to the: 0 for 0 minutes, including:           Aury participated in neuromuscular re-education activities to improve: Kinesthetic, Proprioception, and Posture for 44 minutes. The following activities were included:     Cervical med x machine 129# 1x 20 (seat 2 inch plus 323 height) CB 1.0 0-90 degrees; 144# 1x 10  Trunk med x machine 46 # x 20 (can increase on follow up)  Seated trunk rotation machine 20 # 1x 20  Prone neck retraction with RTB (red theraband) for resistance 2x 10  Scapular engaged with shoulder abduction hold (RTB with Shoulders at 90 flexion in scaption) while single leg press and PPT 2x 10 (to engage core)  Dead bug (head down on table) 2x 5 per side; head off table 2x 5    Deferred:  Prone T 2x 10 with scapular training  Prone w to Y 2x 10 with yellow theraband for resistance  Josué's (3 x 5) with RTB  QP Alt LE ext with chin tuck (3 x 5)  B weighted carry (3 laps, 8 lbs)  Sidelying horizontal abduction (2 x 10, 2 lbs)  Straight arm pull downs (2 x 10, 20 lbs)  Seated chin tuck with retraction 2x 10 with red theraband  Modified on elbow plank with cervical retraction with red theraband 2x 10  Moving Modified plank on elbows (cat to mid ROM but on elbows to reduce wrist pain) 3x 10 with focus on core engagement  SLR with PPT 2x 10 with core engaged  Qped scapular protract/relax keeping core engaged in flexion 1x 15    Home Exercises Provided and Patient Education Provided      Education/Self-Care provided: (during therex)    Patient educated on the importance of improved core and upper and lower extremity strength in order to improve alignment of the spine and upper and lower extremities with static positions and dynamic movement.   Patient educated on the importance of strong core and lower extremity musculature in order to improve both static and dynamic balance, improve gait mechanics, reduce fall risk and improve household and community mobility.       Written Home Exercises Provided: Patient instructed to cont prior HEP.  Exercises were reviewed and Aury was able to demonstrate them prior to the end of the session.  Aury demonstrated good  understanding of the education provided.     See EMR under Patient Instructions for exercises provided 12/23/2024.    ASSESSMENT     PT continued to focus on stability and educated pt in using external support to reduce pain in the back using a 1/2 vinny roll vs sitting hyper extended. Pt appreciated the tool as it eased her discomfort and allowed her to feel support and rest her spine when seated. PT advanced core as tolerated. Pt could only keep core engaged for up to 5 reps as she fatigues quickly on this. PT to advance as tolerated.     Aury Is progressing well towards her goals.   Pt prognosis is Good.     Pt will continue to benefit from skilled outpatient physical therapy to address the deficits listed in the problem list box on initial evaluation, provide pt/family education and to maximize pt's level of independence in the home and community environment.     Pt's spiritual, cultural and educational needs considered and pt agreeable to plan of care and goals.     Anticipated barriers to physical therapy: none    Goals:     Short Term Goals: In 4 weeks   1.Pt to be educated on HEP. met  2.Patient to increase cervical flexion to 60 degrees or better. met  3.Patient to increase strength by 1/2 grade. met  4.Patient to have pain less  than 3/10 or better at all times. met  5.Patient to improve score on the FOTO by 20%. met  6. Pt to be educated on postural and body mechanics awareness. met     Long Term Goals: In 10 weeks 2/25/25  1. Patient to improve score on the FOTO to 64. progressing  2. Patient to demo increase in UE strength to 4/5 in the lower traps for better.progressing  3. Patient to have decreased pain to 2/10 at all times.progressing  4. Patient to perform daily activities including dead lift up to 40 # and overhead lifting up to 5 # or better. progressing        Plan   Plan of care Certification: 12/19/2024 to 2/25/25.     Outpatient Physical Therapy 2 times weekly for 10 weeks to include the following interventions: Cervical/Lumbar Traction, Electrical Stimulation IFC, NMES, Gait Training, Manual Therapy, Moist Heat/ Ice, Neuromuscular Re-ed, Patient Education, Self Care, Therapeutic Activities, Therapeutic Exercise, and DN.      Continue Plan of Care (POC) and progress per patient tolerance.    Sparkle Duron, PT, CIDN, SFMA, CEAS-1

## 2025-01-21 ENCOUNTER — TELEPHONE (OUTPATIENT)
Dept: REHABILITATION | Facility: HOSPITAL | Age: 35
End: 2025-01-21
Payer: MEDICAID

## 2025-01-21 NOTE — TELEPHONE ENCOUNTER
PT left a message that we will be closed Wed. We have some openings Thur if she wants to make up an appt otherwise we will see her Friday as scheduled.

## 2025-01-24 ENCOUNTER — CLINICAL SUPPORT (OUTPATIENT)
Dept: REHABILITATION | Facility: HOSPITAL | Age: 35
End: 2025-01-24
Payer: MEDICAID

## 2025-01-24 DIAGNOSIS — M54.2 NECK PAIN: Primary | ICD-10-CM

## 2025-01-24 DIAGNOSIS — R29.898 DECREASED ROM OF NECK: ICD-10-CM

## 2025-01-24 DIAGNOSIS — M54.2 CERVICALGIA: ICD-10-CM

## 2025-01-24 PROCEDURE — 97110 THERAPEUTIC EXERCISES: CPT | Mod: PN

## 2025-01-24 PROCEDURE — 97112 NEUROMUSCULAR REEDUCATION: CPT | Mod: PN

## 2025-01-24 NOTE — PROGRESS NOTES
Physical Therapy Daily Treatment Note     Name: Aury Dupree  Clinic Number: [id number]    Therapy Diagnosis:   Encounter Diagnoses   Name Primary?    Neck pain Yes    Decreased ROM of neck     Cervicalgia      Physician: Velvet Reid MD    Visit Date: 1/24/2025    Physician Orders: PT Eval and Treat   Medical Diagnosis from Referral: M54.2 (ICD-10-CM) - Cervicalgia   Evaluation Date: 12/19/2024  Authorization Period Expiration: 12/31/25  Plan of Care Expiration: 2/25/25  Visit # / Visits authorized: (4 in 2024) 5/20 (2025)  Foto: 2/3 (2nd given on 01/02/25)  Progress note due 30 days from 1/15/25     Precautions: Standard, graves, hashimoto's, fibromyalgia    Time In: 7:30 AM   Time Out: 8:28 AM  Total Time: 58 minutes with PT and tech assistance    SUBJECTIVE     Today, pt reports: she has been having some burning pain in the R shoulder with simple reaching at the upper shoulder but not with her HEP tasks.    She was compliant with home exercise program.  Response to previous treatment: sore, ache, headache  Functional change: none    Pre-Treatment Pain: 3/10  Post-Treatment Pain: 2/10  Location: neck, L low back and       OBJECTIVE          TREATMENT     Aury CHAVEZ received therapeutic exercises to develop strength and flexibility for 12 minutes including:     UBE backwards only 5 min  Seated rows 30# 1x20  Standing shoulder shrugs 3x 10 8# per hand for ROM       Aury received the following manual therapy techniques: Myofacial release and Soft tissue Mobilization were applied to the: 0 for 0 minutes, including:           Aury participated in neuromuscular re-education activities to improve: Kinesthetic, Proprioception, and Posture for 43 minutes. The following activities were included:       Cervical med x machine 132# 1x 20 (seat 2 inch plus 323 height) CB 1.0 0-90 degrees; 144# 1x 20  Trunk med x machine 40 # x 10 ;60 # 1x 20 (increase on follow up)  Seated trunk rotation machine 20 # 1x 20  Prone T 2x 10  with scapular training 1#  Prone w to Y 2x 10 with yellow theraband for resistance  's carry 2# 50 ft x 3 per arm  Bird dog leaning on plinth on elbows 3x 10 B  B weighted carry (3 laps, 8 lbs)    Deferred:  Prone neck retraction with RTB (red theraband) for resistance 2x 10  Scapular engaged with shoulder abduction hold (RTB with Shoulders at 90 flexion in scaption) while single leg press and PPT 2x 10 (to engage core)  Dead bug (head down on table) 2x 5 per side; head off table 2x 5  QP Alt LE ext with chin tuck (3 x 5)  Sidelying horizontal abduction (2 x 10, 2 lbs)  Straight arm pull downs (2 x 10, 20 lbs)  Seated chin tuck with retraction 2x 10 with red theraband  Modified on elbow plank with cervical retraction with red theraband 2x 10  Moving Modified plank on elbows (cat to mid ROM but on elbows to reduce wrist pain) 3x 10 with focus on core engagement  SLR with PPT 2x 10 with core engaged  Qped scapular protract/relax keeping core engaged in flexion 1x 15    Home Exercises Provided and Patient Education Provided     Education/Self-Care provided: (during therex)    Patient educated on the importance of improved core and upper and lower extremity strength in order to improve alignment of the spine and upper and lower extremities with static positions and dynamic movement.   Patient educated on the importance of strong core and lower extremity musculature in order to improve both static and dynamic balance, improve gait mechanics, reduce fall risk and improve household and community mobility.       Written Home Exercises Provided: Patient instructed to cont prior HEP.  Exercises were reviewed and Aury was able to demonstrate them prior to the end of the session.  Aury demonstrated good  understanding of the education provided.     See EMR under Patient Instructions for exercises provided 12/23/2024.    ASSESSMENT     PT continued to focus on stability of the core and scapular stabilizers. Pt is  surprised that she is handling added weight with good control and not having the back or neck pain she was experiencing. Pt is also having no pain with driving now when prior it was poorly tolerated. PT educated pt in histimine response we are still seeing on the skin suggesting dysbiosis and suggested she learn about low histimine foods and nutritional supplements to help with reducing inflammation. PT suggested she follow with someone trained to help her reduce inflammation with nutritional changes. PT to advance as tolerated.     Aury Is progressing well towards her goals.   Pt prognosis is Good.     Pt will continue to benefit from skilled outpatient physical therapy to address the deficits listed in the problem list box on initial evaluation, provide pt/family education and to maximize pt's level of independence in the home and community environment.     Pt's spiritual, cultural and educational needs considered and pt agreeable to plan of care and goals.     Anticipated barriers to physical therapy: none    Goals:     Short Term Goals: In 4 weeks   1.Pt to be educated on HEP. met  2.Patient to increase cervical flexion to 60 degrees or better. met  3.Patient to increase strength by 1/2 grade. met  4.Patient to have pain less than 3/10 or better at all times. met  5.Patient to improve score on the FOTO by 20%. met  6. Pt to be educated on postural and body mechanics awareness. met     Long Term Goals: In 10 weeks 2/25/25  1. Patient to improve score on the FOTO to 64. progressing  2. Patient to demo increase in UE strength to 4/5 in the lower traps for better.progressing  3. Patient to have decreased pain to 2/10 at all times.progressing  4. Patient to perform daily activities including dead lift up to 40 # and overhead lifting up to 5 # or better. progressing        Plan   Plan of care Certification: 12/19/2024 to 2/25/25.     Outpatient Physical Therapy 2 times weekly for 10 weeks to include the following  interventions: Cervical/Lumbar Traction, Electrical Stimulation IFC, NMES, Gait Training, Manual Therapy, Moist Heat/ Ice, Neuromuscular Re-ed, Patient Education, Self Care, Therapeutic Activities, Therapeutic Exercise, and DN.      Continue Plan of Care (POC) and progress per patient tolerance.    Sparkle Duron, PT, CIDN, SFMA, CEAS-1

## 2025-01-27 ENCOUNTER — CLINICAL SUPPORT (OUTPATIENT)
Dept: REHABILITATION | Facility: HOSPITAL | Age: 35
End: 2025-01-27
Payer: MEDICAID

## 2025-01-27 DIAGNOSIS — R29.898 DECREASED ROM OF NECK: ICD-10-CM

## 2025-01-27 DIAGNOSIS — M54.2 CERVICALGIA: ICD-10-CM

## 2025-01-27 DIAGNOSIS — M54.2 NECK PAIN: Primary | ICD-10-CM

## 2025-01-27 PROCEDURE — 97112 NEUROMUSCULAR REEDUCATION: CPT | Mod: PN

## 2025-01-27 PROCEDURE — 97110 THERAPEUTIC EXERCISES: CPT | Mod: PN

## 2025-01-27 NOTE — PROGRESS NOTES
Physical Therapy Daily Treatment Note     Name: Aury Dupree  Clinic Number: [id number]    Therapy Diagnosis:   Encounter Diagnoses   Name Primary?    Neck pain Yes    Decreased ROM of neck     Cervicalgia      Physician: Velvet Reid MD    Visit Date: 1/27/2025    Physician Orders: PT Eval and Treat   Medical Diagnosis from Referral: M54.2 (ICD-10-CM) - Cervicalgia   Evaluation Date: 12/19/2024  Authorization Period Expiration: 12/31/25  Plan of Care Expiration: 2/25/25  Visit # / Visits authorized: (4 in 2024) 5/20 (2025)  Foto: 2/3 (2nd given on 01/02/25)  Progress note due 30 days from 1/15/25     Precautions: Standard, graves, hashimoto's, fibromyalgia    Time In: 7:03 AM   Time Out: 8:00 AM  Total Time: 57 minutes with PT and tech assistance    SUBJECTIVE     Today, pt reports: she is having less R shoulder pain today but notes her back bothers her in some chair such as our lobby chair. She feels much better with a back support like our lumbar cushion.    She was compliant with home exercise program.  Response to previous treatment: sore, ache, headache  Functional change: none    Pre-Treatment Pain: 2/10  Post-Treatment Pain: 1/10  Location: neck, L low back and       OBJECTIVE          TREATMENT     Aury CHAVEZ received therapeutic exercises to develop strength and flexibility for 12 minutes including:     UBE backwards only 5 min  Seated rows 30# 1x20  Standing shoulder shrugs 3x 10 8# per hand for ROM       Aury received the following manual therapy techniques: Myofacial release and Soft tissue Mobilization were applied to the: 0 for 0 minutes, including:           Aury participated in neuromuscular re-education activities to improve: Kinesthetic, Proprioception, and Posture for 45 minutes. The following activities were included:     Cervical med x machine 147# 1x 40 (seat 2 inch plus 323 height) CB 1.0 0-90 degrees (increase load on follow up)  Trunk med x machine 60 # 1x 20;  66# 1x 20 (increase on  follow up)  Seated trunk rotation machine 24 # 1x 20  Prone T 1x10; 2x 13 with scapular training 1#  Prone Y 1# 3x 10 with core engaged   Bird dog leaning on plinth on elbows 3x 10 B  Dead bug (head down on table) 2x 5 per side; head off table 2x 5    Deferred:  Prone w to Y 2x 10 with yellow theraband for resistance  's carry 2# 50 ft x 3 per arm  B weighted carry (3 laps, 8 lbs)  Prone neck retraction with RTB (red theraband) for resistance 2x 10  Scapular engaged with shoulder abduction hold (RTB with Shoulders at 90 flexion in scaption) while single leg press and PPT 2x 10 (to engage core)  QP Alt LE ext with chin tuck (3 x 5)  Sidelying horizontal abduction (2 x 10, 2 lbs)  Straight arm pull downs (2 x 10, 20 lbs)  Seated chin tuck with retraction 2x 10 with red theraband  Modified on elbow plank with cervical retraction with red theraband 2x 10  Moving Modified plank on elbows (cat to mid ROM but on elbows to reduce wrist pain) 3x 10 with focus on core engagement  SLR with PPT 2x 10 with core engaged  Qped scapular protract/relax keeping core engaged in flexion 1x 15    Home Exercises Provided and Patient Education Provided     Education/Self-Care provided: (during therex)    Patient educated on the importance of improved core and upper and lower extremity strength in order to improve alignment of the spine and upper and lower extremities with static positions and dynamic movement.   Patient educated on the importance of strong core and lower extremity musculature in order to improve both static and dynamic balance, improve gait mechanics, reduce fall risk and improve household and community mobility.       Written Home Exercises Provided: Patient instructed to cont prior HEP.  Exercises were reviewed and Aury was able to demonstrate them prior to the end of the session.  Aury demonstrated good  understanding of the education provided.     See EMR under Patient Instructions for exercises provided  12/23/2024.    ASSESSMENT     PT continued to focus on providing pt with load increase as tolerated to increase motor control. Pt is noting improved function at home but still gets back pain at times. PT again discussed the need to work on inflammation from a nutritional basis as well to help reduce pain and fibromyalgia complaints. Pt also encouraged to get a back supporting cushion for chairs that bother her. PT advanced load as tolerated as simple R scapular work is still challenging and she has to use a lot of cues to engage the core to reduce hyper trunk extension.    PT to advance as tolerated.     Aury Is progressing well towards her goals.   Pt prognosis is Good.     Pt will continue to benefit from skilled outpatient physical therapy to address the deficits listed in the problem list box on initial evaluation, provide pt/family education and to maximize pt's level of independence in the home and community environment.     Pt's spiritual, cultural and educational needs considered and pt agreeable to plan of care and goals.     Anticipated barriers to physical therapy: none    Goals:     Short Term Goals: In 4 weeks   1.Pt to be educated on HEP. met  2.Patient to increase cervical flexion to 60 degrees or better. met  3.Patient to increase strength by 1/2 grade. met  4.Patient to have pain less than 3/10 or better at all times. met  5.Patient to improve score on the FOTO by 20%. met  6. Pt to be educated on postural and body mechanics awareness. met     Long Term Goals: In 10 weeks 2/25/25  1. Patient to improve score on the FOTO to 64. progressing  2. Patient to demo increase in UE strength to 4/5 in the lower traps for better.progressing  3. Patient to have decreased pain to 2/10 at all times.progressing  4. Patient to perform daily activities including dead lift up to 40 # and overhead lifting up to 5 # or better. progressing        Plan   Plan of care Certification: 12/19/2024 to 2/25/25.     Outpatient  Physical Therapy 2 times weekly for 10 weeks to include the following interventions: Cervical/Lumbar Traction, Electrical Stimulation IFC, NMES, Gait Training, Manual Therapy, Moist Heat/ Ice, Neuromuscular Re-ed, Patient Education, Self Care, Therapeutic Activities, Therapeutic Exercise, and DN.      Continue Plan of Care (POC) and progress per patient tolerance.    Sparkle Duron, PT, CIDN, SFMA, CEAS-1

## 2025-01-28 NOTE — PROGRESS NOTES
Physical Therapy Daily Treatment Note     Name: Aury Dupree  Clinic Number: [id number]    Therapy Diagnosis:   Encounter Diagnoses   Name Primary?    Neck pain Yes    Decreased ROM of neck      Physician: Velvet Reid MD    Visit Date: 1/29/2025    Physician Orders: PT Eval and Treat   Medical Diagnosis from Referral: M54.2 (ICD-10-CM) - Cervicalgia   Evaluation Date: 12/19/2024  Authorization Period Expiration: 12/31/25  Plan of Care Expiration: 2/25/25  Visit # / Visits authorized: (4 in 2024) 8/20 (2025)  Foto: 2/3 (2nd given on 01/02/25)  Progress note due 30 days from 1/15/25     Precautions: Standard, graves, hashimoto's, fibromyalgia    Time In: 7:00 AM   Time Out: 8:00 AM  Total Time: 60 minutes with PT and tech assistance    SUBJECTIVE     Today, pt reports: sore from her workout but overall doing better.    She was compliant with home exercise program.  Response to previous treatment: sore, ache, headache  Functional change: sitting is easier and less painful    Pre-Treatment Pain: 2/10  Post-Treatment Pain: 1/10  Location: neck, L low back and       OBJECTIVE          TREATMENT     Aury CHAVEZ received therapeutic exercises to develop strength and flexibility for 10 minutes including:     UBE backwards only 6 min  Seated rows 35# 3x 10    Deferred:  Standing shoulder shrugs 3x 10 8# per hand for ROM       Aury received the following manual therapy techniques: Myofacial release and Soft tissue Mobilization were applied to the: 0 for 0 minutes, including:           Aury participated in neuromuscular re-education activities to improve: Kinesthetic, Proprioception, and Posture for 35 minutes. The following activities were included:     Cervical med x machine 174# 1x 20 (seat 2 inch plus 323 height) CB 1.0 0-90 degrees; 165# 1x 15  Trunk med x machine 60 # 1x 20;  68# 1x 20   Seated trunk rotation machine 26 # 1x 20  Prone T 1x10; 2x 13 with scapular training 1#  Prone Y 1# 3x 10 with core engaged   Bird  dog leaning on plinth on elbows 3x 10 B  Dead bug (head down on table) 2x 5 per side; head off table 2x 5 add 1 # per hand throughout      Deferred:  Prone w to Y 2x 10 with yellow theraband for resistance  's carry 2# 50 ft x 3 per arm  B weighted carry (3 laps, 8 lbs)  Prone neck retraction with RTB (red theraband) for resistance 2x 10  Scapular engaged with shoulder abduction hold (RTB with Shoulders at 90 flexion in scaption) while single leg press and PPT 2x 10 (to engage core)  QP Alt LE ext with chin tuck (3 x 5)  Sidelying horizontal abduction (2 x 10, 2 lbs)  Straight arm pull downs (2 x 10, 20 lbs)  Seated chin tuck with retraction 2x 10 with red theraband  Modified on elbow plank with cervical retraction with red theraband 2x 10  Moving Modified plank on elbows (cat to mid ROM but on elbows to reduce wrist pain) 3x 10 with focus on core engagement  SLR with PPT 2x 10 with core engaged  Qped scapular protract/relax keeping core engaged in flexion 1x 15    Aury participated in therapeutic activities to improve: home function for 15 minutes. The following activities were included:    Overhead lifting 4# 3x 10 R then 3x 10 L  Dead lift 15# per hand 2x 10 (30 # total)  's carry 4# 50 ft x 3    Home Exercises Provided and Patient Education Provided     Education/Self-Care provided: (during therex)    Patient educated on the importance of improved core and upper and lower extremity strength in order to improve alignment of the spine and upper and lower extremities with static positions and dynamic movement.   Patient educated on the importance of strong core and lower extremity musculature in order to improve both static and dynamic balance, improve gait mechanics, reduce fall risk and improve household and community mobility.       Written Home Exercises Provided: Patient instructed to cont prior HEP.  Exercises were reviewed and Aury was able to demonstrate them prior to the end of the session.   Aury demonstrated good  understanding of the education provided.     See EMR under Patient Instructions for exercises provided 12/23/2024.    ASSESSMENT     PT continued to progress pt more towards discharge as she is getting stronger and needing less cues. PT advanced her to more functional tasks today to begin engaging the trunk and core more to reduce pain with home tasks. Pt overall is showing better endurance, strength and motor control.  PT advanced load as tolerated as simple R scapular work is still challenging and she has to use a lot of cues to engage the core to reduce hyper trunk extension.    PT to advance as tolerated.     Aury Is progressing well towards her goals.   Pt prognosis is Good.     Pt will continue to benefit from skilled outpatient physical therapy to address the deficits listed in the problem list box on initial evaluation, provide pt/family education and to maximize pt's level of independence in the home and community environment.     Pt's spiritual, cultural and educational needs considered and pt agreeable to plan of care and goals.     Anticipated barriers to physical therapy: none    Goals:     Short Term Goals: In 4 weeks   1.Pt to be educated on HEP. met  2.Patient to increase cervical flexion to 60 degrees or better. met  3.Patient to increase strength by 1/2 grade. met  4.Patient to have pain less than 3/10 or better at all times. met  5.Patient to improve score on the FOTO by 20%. met  6. Pt to be educated on postural and body mechanics awareness. met     Long Term Goals: In 10 weeks 2/25/25  1. Patient to improve score on the FOTO to 64. progressing  2. Patient to demo increase in UE strength to 4/5 in the lower traps for better.progressing  3. Patient to have decreased pain to 2/10 at all times.progressing  4. Patient to perform daily activities including dead lift up to 40 # and overhead lifting up to 5 # or better. progressing        Plan   Plan of care Certification:  12/19/2024 to 2/25/25.     Outpatient Physical Therapy 2 times weekly for 10 weeks to include the following interventions: Cervical/Lumbar Traction, Electrical Stimulation IFC, NMES, Gait Training, Manual Therapy, Moist Heat/ Ice, Neuromuscular Re-ed, Patient Education, Self Care, Therapeutic Activities, Therapeutic Exercise, and DN.      Continue Plan of Care (POC) and progress per patient tolerance.    Sparkle Duron, PT, CIDN, SFMA, CEAS-1

## 2025-01-29 ENCOUNTER — CLINICAL SUPPORT (OUTPATIENT)
Dept: REHABILITATION | Facility: HOSPITAL | Age: 35
End: 2025-01-29
Payer: MEDICAID

## 2025-01-29 DIAGNOSIS — M54.2 NECK PAIN: Primary | ICD-10-CM

## 2025-01-29 DIAGNOSIS — R29.898 DECREASED ROM OF NECK: ICD-10-CM

## 2025-01-29 PROCEDURE — 97530 THERAPEUTIC ACTIVITIES: CPT | Mod: PN

## 2025-01-29 PROCEDURE — 97112 NEUROMUSCULAR REEDUCATION: CPT | Mod: PN

## 2025-01-29 PROCEDURE — 97110 THERAPEUTIC EXERCISES: CPT | Mod: PN

## 2025-01-31 NOTE — PROGRESS NOTES
Physical Therapy Daily Treatment Note and Discharge Note     Name: Aury Dupree  Clinic Number: [id number]    Therapy Diagnosis:   Encounter Diagnoses   Name Primary?    Neck pain Yes    Decreased ROM of neck      Physician: Velvet Reid MD    Visit Date: 2/3/2025    Physician Orders: PT Eval and Treat   Medical Diagnosis from Referral: M54.2 (ICD-10-CM) - Cervicalgia   Evaluation Date: 12/19/2024  Authorization Period Expiration: 12/31/25  Plan of Care Expiration: 2/25/25  Visit # / Visits authorized: (4 in 2024) 9/20 (2025)  Foto: 2/3 (2nd given on 01/02/25)  Progress note due 30 days from 1/15/25     Precautions: Standard, graves, hashimoto's, fibromyalgia    Time In: 7:01 AM   Time Out: 8:00AM  Total Time: 59 minutes with PT and tech assistance    SUBJECTIVE     Today, pt reports: she has progressed her HEP some and is finding when she does her HEP she is having less pain sitting, standing and performing daily tasks. Headaches are less frequent. Overall has a few questions then thinks she is ready to d/c to self care.    She was compliant with home exercise program.  Response to previous treatment: sore, ache, headache  Functional change: sitting is easier and less painful    Pre-Treatment Pain: 2/10  Post-Treatment Pain: 1/10  Location: neck, L low back and shoulders      OBJECTIVE     Cervical Spine AROM:    Degrees Pain   Flexion 40 improved to 60 y   Extend 80 y   R side bend 45;45 y   L side bend 35;45 y   R rotation 80;90 y   L rotation 75;90 y   Upper cervical flexion 70 % improved to full; Rotation full         Strength at eval/today  Muscle (Myotome) Right Left   Cervical Deep neck Flexor hold time 10 sec/32 sec; 35 sec today     Shoulder Abduction 5 5   Elbow Flexors (C5) 5 5   Wrist Extensors (C6) 5 5   Elbow Extensors (C7) 5 5   ER (arm at side) 5 at 0 abduction; 4+ at 90 degrees; 5/5 today 5 at 0 abduction; 4+ at 90 degrees   IR (arm at side) 5 5   Thumb extensors 5 5   Intrinsic strength 5 5    Mid traps 4/5 B; 5/5 today  Lower traps 2+/5 B; 4+/5 today  Flexion R 5/5; L 4+/5; 5/5        TREATMENT     Aury CHAVEZ received therapeutic exercises to develop strength and flexibility for 14 minutes including:     UBE backwards only 6 min  Shoulder shrugs 20 # per hand 2x 10  Seated rows 35# 3x 10         Aury received the following manual therapy techniques: Myofacial release and Soft tissue Mobilization were applied to the: 0 for 0 minutes, including:           Aury participated in neuromuscular re-education activities to improve: Kinesthetic, Proprioception, and Posture for 29 minutes. The following activities were included:     Cervical med x machine 174# 1x 20 (seat 2 inch plus 323 height) CB 1.0 0-90 degrees; 162# 1x 15  Standing Y 2x 10  Standing T 2x 10  Chest press 10 # total 2x 10  Bird dog leaning on plinth on elbows 3x 10 B  Dead bug (head down on table) 2x 5 per side; head off table 2x 5 add 1 # per hand throughout  Trunk med x machine 60 # 1x 20;  70# 1x 20   Seated trunk rotation machine 26 # 1x 20      Aury participated in therapeutic activities to improve: home function for 16 minutes. The following activities were included:    Dead lift 20# per hand 3x 5 (40 # total)  Squat 20 # per hand 3x 5  's carry 4# 50 ft x 3    Home Exercises Provided and Patient Education Provided     Education/Self-Care provided: (during therex)  Continue with Updated HEP.       Written Home Exercises Provided: Patient instructed to cont prior HEP.  Exercises were reviewed and Aury was able to demonstrate them prior to the end of the session.  Aury demonstrated good  understanding of the education provided.     See EMR under Patient Instructions for exercises provided 12/23/2024.    ASSESSMENT     PT continued to progress pt on combined movements to challenge balance, coordination and core activation with UE use more. Pt was surprised how strong she is getting and pain is less. Pt is progressing and has met  goals. She is ready to d/c to self care.  PT to d/c to self care as goals are met.    Aury Is progressing well towards her goals.   Pt prognosis is Good.     Pt's spiritual, cultural and educational needs considered and pt agreeable to plan of care and goals.     Anticipated barriers to physical therapy: none    Goals:     Short Term Goals: In 4 weeks   1.Pt to be educated on HEP. met  2.Patient to increase cervical flexion to 60 degrees or better. met  3.Patient to increase strength by 1/2 grade. met  4.Patient to have pain less than 3/10 or better at all times. met  5.Patient to improve score on the FOTO by 20%. met  6. Pt to be educated on postural and body mechanics awareness. met     Long Term Goals: In 10 weeks 2/25/25  1. Patient to improve score on the FOTO to 64. Met at 67  2. Patient to demo increase in UE strength to 4/5 in the lower traps for better.met  3. Patient to have decreased pain to 2/10 at all times.met  4. Patient to perform daily activities including dead lift up to 40 # and overhead lifting up to 5 # or better. met        Plan   Pt is I with HEP and advanced to d/c today.    Sparkle Duron, PT, CIDN, SFMA, CEAS-1

## 2025-02-03 ENCOUNTER — CLINICAL SUPPORT (OUTPATIENT)
Dept: REHABILITATION | Facility: HOSPITAL | Age: 35
End: 2025-02-03
Payer: MEDICAID

## 2025-02-03 DIAGNOSIS — M54.2 NECK PAIN: Primary | ICD-10-CM

## 2025-02-03 DIAGNOSIS — R29.898 DECREASED ROM OF NECK: ICD-10-CM

## 2025-02-03 PROCEDURE — 97110 THERAPEUTIC EXERCISES: CPT | Mod: PN

## 2025-02-03 PROCEDURE — 97112 NEUROMUSCULAR REEDUCATION: CPT | Mod: PN

## 2025-02-03 PROCEDURE — 97530 THERAPEUTIC ACTIVITIES: CPT | Mod: PN

## 2025-02-18 RX ORDER — ATENOLOL 25 MG/1
25 TABLET ORAL 2 TIMES DAILY
Qty: 60 TABLET | Refills: 0 | Status: SHIPPED | OUTPATIENT
Start: 2025-02-18

## 2025-02-27 DIAGNOSIS — F41.1 GAD (GENERALIZED ANXIETY DISORDER): ICD-10-CM

## 2025-02-27 RX ORDER — FLUOXETINE HYDROCHLORIDE 20 MG/1
60 CAPSULE ORAL
Qty: 270 CAPSULE | Refills: 0 | Status: SHIPPED | OUTPATIENT
Start: 2025-02-27

## 2025-04-21 RX ORDER — ATENOLOL 25 MG/1
25 TABLET ORAL 2 TIMES DAILY
Qty: 60 TABLET | Refills: 0 | Status: SHIPPED | OUTPATIENT
Start: 2025-04-21

## 2025-05-12 DIAGNOSIS — F41.1 GAD (GENERALIZED ANXIETY DISORDER): ICD-10-CM

## 2025-05-14 RX ORDER — FLUOXETINE 20 MG/1
60 CAPSULE ORAL
Qty: 270 CAPSULE | Refills: 0 | Status: SHIPPED | OUTPATIENT
Start: 2025-05-14

## 2025-05-14 RX ORDER — FLUOXETINE 20 MG/1
60 CAPSULE ORAL DAILY
Qty: 90 CAPSULE | Refills: 0 | Status: SHIPPED | OUTPATIENT
Start: 2025-05-14 | End: 2025-08-12

## 2025-06-04 DIAGNOSIS — R73.03 PREDIABETES: ICD-10-CM

## 2025-08-01 RX ORDER — ATENOLOL 25 MG/1
25 TABLET ORAL 2 TIMES DAILY
Qty: 60 TABLET | Refills: 0 | Status: SHIPPED | OUTPATIENT
Start: 2025-08-01